# Patient Record
Sex: FEMALE | Race: WHITE | Employment: FULL TIME | URBAN - METROPOLITAN AREA
[De-identification: names, ages, dates, MRNs, and addresses within clinical notes are randomized per-mention and may not be internally consistent; named-entity substitution may affect disease eponyms.]

---

## 2019-11-01 ENCOUNTER — OP HISTORICAL/CONVERTED ENCOUNTER (OUTPATIENT)
Dept: OTHER | Age: 44
End: 2019-11-01

## 2019-11-08 ENCOUNTER — OP HISTORICAL/CONVERTED ENCOUNTER (OUTPATIENT)
Dept: OTHER | Age: 44
End: 2019-11-08

## 2020-10-05 ENCOUNTER — TRANSCRIBE ORDER (OUTPATIENT)
Dept: SCHEDULING | Age: 45
End: 2020-10-05

## 2020-10-05 DIAGNOSIS — Z12.31 SCREENING MAMMOGRAM, ENCOUNTER FOR: Primary | ICD-10-CM

## 2020-10-20 ENCOUNTER — HOSPITAL ENCOUNTER (OUTPATIENT)
Dept: MAMMOGRAPHY | Age: 45
Discharge: HOME | End: 2020-10-20
Payer: MEDICAID

## 2020-10-20 DIAGNOSIS — Z12.31 SCREENING MAMMOGRAM, ENCOUNTER FOR: ICD-10-CM

## 2020-10-20 PROCEDURE — 77067 SCR MAMMO BI INCL CAD: CPT

## 2021-01-13 VITALS
HEIGHT: 59 IN | HEART RATE: 106 BPM | DIASTOLIC BLOOD PRESSURE: 58 MMHG | WEIGHT: 98 LBS | SYSTOLIC BLOOD PRESSURE: 93 MMHG | BODY MASS INDEX: 19.76 KG/M2

## 2021-01-13 RX ORDER — FLUCONAZOLE 150 MG/1
150 TABLET ORAL DAILY
COMMUNITY
End: 2022-05-02

## 2021-01-13 RX ORDER — OXYCODONE AND ACETAMINOPHEN 5; 325 MG/1; MG/1
TABLET ORAL
COMMUNITY
End: 2022-05-02

## 2021-01-13 RX ORDER — LEVOTHYROXINE SODIUM 50 UG/1
TABLET ORAL
COMMUNITY
End: 2022-05-02

## 2021-01-13 RX ORDER — AMITRIPTYLINE HYDROCHLORIDE 25 MG/1
TABLET, FILM COATED ORAL
COMMUNITY
End: 2022-05-02

## 2021-01-13 RX ORDER — POLYETHYLENE GLYCOL 3350, SODIUM CHLORIDE, SODIUM BICARBONATE, POTASSIUM CHLORIDE 420; 11.2; 5.72; 1.48 G/4L; G/4L; G/4L; G/4L
4000 POWDER, FOR SOLUTION ORAL
COMMUNITY
End: 2022-05-02

## 2021-01-13 RX ORDER — METRONIDAZOLE 500 MG/1
TABLET ORAL 3 TIMES DAILY
COMMUNITY
End: 2022-05-02

## 2022-05-02 ENCOUNTER — OFFICE VISIT (OUTPATIENT)
Dept: ENDOCRINOLOGY | Age: 47
End: 2022-05-02
Payer: MEDICAID

## 2022-05-02 ENCOUNTER — TELEPHONE (OUTPATIENT)
Dept: ENDOCRINOLOGY | Age: 47
End: 2022-05-02

## 2022-05-02 VITALS
DIASTOLIC BLOOD PRESSURE: 68 MMHG | SYSTOLIC BLOOD PRESSURE: 86 MMHG | OXYGEN SATURATION: 98 % | HEART RATE: 117 BPM | WEIGHT: 156 LBS | TEMPERATURE: 98.1 F | BODY MASS INDEX: 31.45 KG/M2 | HEIGHT: 59 IN | RESPIRATION RATE: 16 BRPM

## 2022-05-02 DIAGNOSIS — K90.0 CELIAC DISEASE: ICD-10-CM

## 2022-05-02 DIAGNOSIS — E03.9 PRIMARY HYPOTHYROIDISM: Primary | ICD-10-CM

## 2022-05-02 PROCEDURE — 99205 OFFICE O/P NEW HI 60 MIN: CPT | Performed by: INTERNAL MEDICINE

## 2022-05-02 RX ORDER — POLYETHYLENE GLYCOL 3350 17 G/17G
17 POWDER, FOR SOLUTION ORAL DAILY
COMMUNITY

## 2022-05-02 RX ORDER — BUSPIRONE HYDROCHLORIDE 7.5 MG/1
1 TABLET ORAL 3 TIMES DAILY
COMMUNITY
End: 2022-07-06

## 2022-05-02 RX ORDER — VITAMIN B COMPLEX
2500 TABLET ORAL DAILY
COMMUNITY

## 2022-05-02 RX ORDER — MELATONIN
1000 DAILY
COMMUNITY

## 2022-05-02 RX ORDER — PANTOPRAZOLE SODIUM 40 MG/1
40 TABLET, DELAYED RELEASE ORAL DAILY
COMMUNITY

## 2022-05-02 RX ORDER — DULOXETIN HYDROCHLORIDE 60 MG/1
60 CAPSULE, DELAYED RELEASE ORAL DAILY
COMMUNITY
End: 2022-07-06

## 2022-05-02 RX ORDER — ERGOCALCIFEROL 1.25 MG/1
50000 CAPSULE ORAL
COMMUNITY

## 2022-05-02 NOTE — LETTER
5/4/2022    Patient: Bertha Boyce   YOB: 1975   Date of Visit: 5/2/2022     Analia Tanner, [address] [place] [phone]  Via Fax: [phone]    Dear Analia Tanner, [address],      Thank you for referring Ms. Gwen Parks to [id number] [place] [address] for evaluation. My notes for this consultation are attached. If you have questions, please do not hesitate to call me. I look forward to following your patient along with you.       Sincerely,    Inessa Junior MD

## 2022-05-02 NOTE — PROGRESS NOTES
Shahbaz Reyes is a 42 y.o. female here for   Chief Complaint   Patient presents with    New Patient       1. Have you been to the ER, urgent care clinic since your last visit? Hospitalized since your last visit? -n/a    2. Have you seen or consulted any other health care providers outside of the [address] [place] [address] since your last visit?   Include any pap smears or colon screening.-n/a

## 2022-05-02 NOTE — PROGRESS NOTES
Izabel Mayers MD         Patient Information   Name : Winston Delgado 42 y.o.     YOB: 1975         Referred by: EDWIN Don         History of present illness    Winston Delgado is a 42 y.o. female  here for evaluation of thyroid. she was diagnosed with hypothyroidism and on replacement thyroid hormone. Reports fluctuating TSH,, the dose was adjusted anywhere from 50 mcg - 125 mcg, she is on levothyroxine 112 mcg  She is on B12 and vitamin D    No change in the size of the neck or neck pain. No dysphagia,dysphonia or dyspnea. No history of known radiation exposure      Does not know family hx     Wt Readings from Last 3 Encounters:   05/02/22 156 lb (70.8 kg)   01/13/21 98 lb (44.5 kg)       Past Medical History:   Diagnosis Date    Generalized anxiety disorder     GERD (gastroesophageal reflux disease)     Hypercholesterolemia     Hypothyroidism     IBS (irritable bowel syndrome)     Moderate recurrent major depression (HCC)     Osteoarthritis of right knee     Tendinitis of right rotator cuff        Current Outpatient Medications   Medication Sig    Unithroid 112 mcg tablet Take 1 Tablet by mouth Daily (before breakfast). Brand medically necessary,    busPIRone (BUSPAR) 7.5 mg tablet Take 1 Tablet by mouth three (3) times daily.  Lactobacillus acidophilus (PROBIOTIC PO) Take 1 Capsule by mouth daily.  DULoxetine (CYMBALTA) 60 mg capsule Take 60 mg by mouth daily.  ergocalciferol (Vitamin D2) 1,250 mcg (50,000 unit) capsule Take 50,000 Units by mouth every seven (7) days.  polyethylene glycol (Miralax) 17 gram packet Take 17 g by mouth daily.  pantoprazole (PROTONIX) 40 mg tablet Take 40 mg by mouth daily.  cyanocobalamin (Vitamin B-12) 2,500 mcg sublingual tablet Take 2,500 mcg by mouth daily.  cholecalciferol (Vitamin D3) (1000 Units /25 mcg) tablet Take 1,000 Units by mouth daily.      No current facility-administered medications for this visit. No Known Allergies      Review of Systems: Per HPI    Physical Examination:  Blood pressure (!) 86/68, pulse (!) 117, temperature 98.1 °F (36.7 °C), temperature source Temporal, resp. rate 16, height 4' 11\" (1.499 m), weight 156 lb (70.8 kg), SpO2 98 %. - General: pleasant, no distress, good eye contact  - HEENT: no exopthalmos, no periorbital edema, no scleral/conjunctival injection, EOMI, no lid lag or stare  - Neck: supple, no thyromegaly, no nodules,no lymphadenopathy  - Cardiovascular: regular, normal rate, normal S1 and S2,  - Respiratory: clear to auscultation bilaterally  - Musculoskeletal: no proximal muscle weakness in upper or lower extremities  - Integumentary: no tremors, no edema,  - Neurological:alert and oriented   -     Data Reviewed:     [x] Reviewed labs      Assessment/Plan:     1. Primary hypothyroidism    2. Celiac disease        Primary hypothyroidism:  Discussed the natural course of hypothyroidism and instructions for levothyroxine,compliance to keep the levels stable. Annual neck exam and annual TSH if this is normal.If the thyroid increases in size or  develops nodules  thyroid ultrasound will be planned. Discussed about the various factors which can affect TSH including medications,weight change,illness, compliance . Discussed various causes of weight gain ,association with other autoimmune conditions,stressed the importance of life style changes. Labs today  Unithroid    Celiac disease    Spent > 60 minutes on the day of the visit reviewing chart, examining, ordering/reviewing labs, counseling, discussing therapeutics and documentation in the medical record    There are no Patient Instructions on file for this visit. Follow-up and Dispositions    · Return in about 2 months (around 7/2/2022) for labs before next visit and follow up.              Patient /caregiver verbalized understanding   Voice-recognition software was used to generate this report, which may result in some phonetic-based errors in the grammar and contents. Even though attempts were made to correct all the mistakes, some may have been missed and remained in the body of the report.

## 2022-05-04 LAB
GLIADIN PEPTIDE IGA SER-ACNC: 4 UNITS (ref 0–19)
GLIADIN PEPTIDE IGG SER-ACNC: 1 UNITS (ref 0–19)
IGA SERPL-MCNC: 139 MG/DL (ref 87–352)
THYROPEROXIDASE AB SERPL-ACNC: 520 IU/ML (ref 0–34)
TTG IGA SER-ACNC: <2 U/ML (ref 0–3)
TTG IGG SER-ACNC: <2 U/ML (ref 0–5)

## 2022-05-04 RX ORDER — LEVOTHYROXINE SODIUM 112 UG/1
112 TABLET ORAL
Qty: 90 TABLET | Refills: 3 | Status: SHIPPED | OUTPATIENT
Start: 2022-05-04 | End: 2022-05-04 | Stop reason: SDUPTHER

## 2022-05-04 RX ORDER — LEVOTHYROXINE SODIUM 112 UG/1
112 TABLET ORAL
Qty: 90 TABLET | Refills: 3 | Status: SHIPPED | OUTPATIENT
Start: 2022-05-04 | End: 2022-07-06 | Stop reason: DRUGHIGH

## 2022-05-05 NOTE — PROGRESS NOTES
Has Hashimotos thyroid disease and hence thyroid has slowed down  Blood test did not show gluten sensitivity but still I would recommend cutting down on gluten as blood test is very sensitive in picking up mild cases   continue Unithroid and follow up as was discussed

## 2022-05-09 ENCOUNTER — TELEPHONE (OUTPATIENT)
Dept: ENDOCRINOLOGY | Age: 47
End: 2022-05-09

## 2022-05-09 NOTE — TELEPHONE ENCOUNTER
Returned patient call and advised patient of Dr. Dacia Ramires lab result note. Patient verbally acknowledged understanding.

## 2022-06-28 PROBLEM — E78.00 HYPERCHOLESTEROLEMIA: Status: ACTIVE | Noted: 2021-08-10

## 2022-06-28 PROBLEM — M17.11 OSTEOARTHRITIS OF RIGHT KNEE: Status: ACTIVE | Noted: 2021-11-08

## 2022-06-28 PROBLEM — K21.9 GASTROESOPHAGEAL REFLUX DISEASE WITHOUT ESOPHAGITIS: Status: ACTIVE | Noted: 2021-08-05

## 2022-06-28 PROBLEM — R74.8 ALKALINE PHOSPHATASE RAISED: Status: ACTIVE | Noted: 2021-08-10

## 2022-06-28 PROBLEM — E03.9 HYPOTHYROIDISM: Status: ACTIVE | Noted: 2021-04-29

## 2022-06-28 PROBLEM — K58.2 IRRITABLE BOWEL SYNDROME WITH ALTERNATING BOWEL HABITS: Status: ACTIVE | Noted: 2021-09-23

## 2022-06-28 PROBLEM — F41.1 GENERALIZED ANXIETY DISORDER: Status: ACTIVE | Noted: 2021-04-29

## 2022-06-28 PROBLEM — M75.81 TENDINITIS OF RIGHT ROTATOR CUFF: Status: ACTIVE | Noted: 2021-11-08

## 2022-06-28 PROBLEM — F33.1 MODERATE RECURRENT MAJOR DEPRESSION (HCC): Status: ACTIVE | Noted: 2021-06-26

## 2022-06-29 LAB
FAX REPORT, 100898: NORMAL
T4 FREE SERPL-MCNC: 2.5 NG/DL (ref 0.82–1.77)
TSH SERPL DL<=0.005 MIU/L-ACNC: 0.02 UIU/ML (ref 0.45–4.5)

## 2022-07-06 ENCOUNTER — OFFICE VISIT (OUTPATIENT)
Dept: ENDOCRINOLOGY | Age: 47
End: 2022-07-06
Payer: MEDICAID

## 2022-07-06 VITALS
DIASTOLIC BLOOD PRESSURE: 95 MMHG | WEIGHT: 126.9 LBS | TEMPERATURE: 97.9 F | BODY MASS INDEX: 25.58 KG/M2 | SYSTOLIC BLOOD PRESSURE: 109 MMHG | HEART RATE: 96 BPM | HEIGHT: 59 IN | OXYGEN SATURATION: 97 %

## 2022-07-06 DIAGNOSIS — E06.3 HASHIMOTO'S THYROIDITIS: ICD-10-CM

## 2022-07-06 DIAGNOSIS — E03.9 ACQUIRED HYPOTHYROIDISM: Primary | ICD-10-CM

## 2022-07-06 DIAGNOSIS — I95.1 ORTHOSTATIC HYPOTENSION: ICD-10-CM

## 2022-07-06 PROBLEM — G47.00 INSOMNIA: Status: ACTIVE | Noted: 2022-06-02

## 2022-07-06 PROCEDURE — 99215 OFFICE O/P EST HI 40 MIN: CPT | Performed by: INTERNAL MEDICINE

## 2022-07-06 RX ORDER — MIDODRINE HYDROCHLORIDE 5 MG/1
10 TABLET ORAL 3 TIMES DAILY
COMMUNITY

## 2022-07-06 RX ORDER — SUMATRIPTAN 50 MG/1
50 TABLET, FILM COATED ORAL
COMMUNITY

## 2022-07-06 RX ORDER — MONTELUKAST SODIUM 10 MG/1
10 TABLET ORAL DAILY
COMMUNITY

## 2022-07-06 RX ORDER — LEVOTHYROXINE SODIUM 88 UG/1
88 TABLET ORAL
Qty: 90 TABLET | Refills: 3 | Status: SHIPPED | OUTPATIENT
Start: 2022-07-06

## 2022-07-06 RX ORDER — SERTRALINE HYDROCHLORIDE 50 MG/1
50 TABLET, FILM COATED ORAL DAILY
COMMUNITY
End: 2022-08-10

## 2022-07-06 NOTE — PATIENT INSTRUCTIONS
POTS     Learning About Postural Orthostatic Tachycardia Syndrome (POTS)  What is POTS? Postural orthostatic tachycardia syndrome (POTS) is a fast heart rate (tachycardia) that starts after you stand up. This can cause symptoms such as dizziness or weakness. What happens when you have POTS? With POTS, the body does not control the heart rate as it should after you stand up. The change in heart rate happens within 10 minutes of standing up. This leads to symptoms such as dizziness, palpitations, trembling, or weakness. It's not known exactly why symptoms happen. People with severe symptoms may find it hard to keep up with daily living. But treatment can help. What are the symptoms? Soon after you stand up, you may have symptoms such as:  · A fast, pounding heartbeat (palpitations). · Trembling, dizziness, weakness, or lightheadedness. · Feeling faint or very tired. With POTS, you may also have problems with:  · Blurred vision, headaches, nausea, and diarrhea. · Trouble sleeping and feeling anxious. · Keeping your attention focused. Symptoms can range from mild to severe. Some things can make symptoms worse. These include heat, menstrual cycle, dehydration, alcohol, exercise, and standing for a long time. When you first notice symptoms, lying down may help you feel better. What causes it? Experts don't understand what causes it, but different body systems seem to be out of balance. POTS may follow certain triggers such as a viral illness, a surgery, or pregnancy. How is POTS diagnosed? To learn what is causing your symptoms, your doctor may:  · Ask about your symptoms, including when and how they started. · Check how your blood pressure and heart rate change when you move from lying down to sitting to standing. · Do blood tests. · Check your heart with an electrocardiogram (EKG).   You may have other tests such as a tilt table test. This test checks how your body responds when you change positions. How is POTS treated? Work with your doctor to find the right mix of treatments to help relieve symptoms and improve your quality of life. These treatments may include:  · Taking medicine prescribed by your doctor. For some people, taking medicine that affects blood pressure can help. Taking medicine that keeps the body's fluids balanced may also help. · Everyday self-care. These practices can be a landry part of helping the body get back in balance. ? Drink plenty of fluids. For many people, low body fluid is part of what makes POTS symptoms worse. ? Eat the amount of salt your doctor tells you to. Salt helps keep up the body's fluid level. ? Try a special exercise program. Your doctor may give you a program of specific exercises. You might start short and slow, especially if fatigue is a problem. Then you can add a little at a time. At first, you may only do exercise when you're reclined. Or you may try swimming. After a while, you start to add upright exercise. ? Wear compression stockings if your doctor recommends them. ? Keep track of your symptoms and what makes them better and worse. When should you call for help? Call 911 anytime you think you may need emergency care. For example, call if:  · You passed out (lost consciousness), and it feels different than your typical episode or you don't recover as quickly as you have in the past.  Call your doctor now or seek immediate medical care if:  · Your symptoms are getting worse. For example, you are more dizzy or lightheaded. Watch closely for changes in your health, and be sure to contact your doctor if:  · You do not get better as expected. Follow-up care is a key part of your treatment and safety. Be sure to make and go to all appointments, and call your doctor if you are having problems. It's also a good idea to know your test results and keep a list of the medicines you take. Where can you learn more?   Go to http://www.gray.com/  Enter X314 in the search box to learn more about \"Learning About Postural Orthostatic Tachycardia Syndrome (POTS). \"  Current as of: January 10, 2022               Content Version: 13.2  © 7052-3385 Healthwise, [employer]. Care instructions adapted under license by [employer] (which disclaims liability or warranty for this information). If you have questions about a medical condition or this instruction, always ask your healthcare professional. [place] [address] any warranty or liability for your use of this information.

## 2022-07-06 NOTE — LETTER
7/6/2022    Patient: Taz Jamison   YOB: 1975   Date of Visit: 7/6/2022     Sun Padilla, [address] [place] [phone]  Via Fax: [phone]    Dear Sun Padilla, [facility] [address],      Thank you for referring Ms. Deb Howard to [address] [place] [address] for evaluation. My notes for this consultation are attached. If you have questions, please do not hesitate to call me. I look forward to following your patient along with you.       Sincerely,    Lesley Radford MD

## 2022-07-06 NOTE — PROGRESS NOTES
Rhett Rushing MD         Patient Information   Name : Sierra Lomeli 41 y.o.     YOB: 1975         Referred by: EDWIN Eubanks         History of present illness    Sierra Lomeli is a 41 y.o. female  here for follow-up    she was diagnosed with hypothyroidism and on replacement thyroid hormone. History of fluctuating TSH, reportedly the dose was adjusted anywhere from 50 mcg - 125 mcg, she is on levothyroxine 112 mcg  She is on B12 and vitamin D  Switched to Unithroid, she is still on Euthyrox  Medications were changed and has lost 30 pounds, intentional weight loss  Usual weight is 97 pounds, she had gained up to 156 pounds and attributes to the medications. She is having hypotension, on midodrine, seen cardiology        No change in the size of the neck or neck pain. No dysphagia,dysphonia or dyspnea. No history of known radiation exposure      Does not know family hx     Wt Readings from Last 3 Encounters:   07/06/22 126 lb 14.4 oz (57.6 kg)   05/02/22 156 lb (70.8 kg)   01/13/21 98 lb (44.5 kg)       Past Medical History:   Diagnosis Date    Generalized anxiety disorder     GERD (gastroesophageal reflux disease)     Hypercholesterolemia     Hypothyroidism     IBS (irritable bowel syndrome)     Moderate recurrent major depression (HCC)     Osteoarthritis of right knee     Tendinitis of right rotator cuff        Current Outpatient Medications   Medication Sig    sertraline (ZOLOFT) 50 mg tablet Take 50 mg by mouth daily.  midodrine (PROAMATINE) 5 mg tablet Take 10 mg by mouth three (3) times daily.  SUMAtriptan (IMITREX) 50 mg tablet Take 50 mg by mouth once as needed for Migraine.  montelukast (Singulair) 10 mg tablet Take 10 mg by mouth daily.  polyethylene glycol (Miralax) 17 gram packet Take 17 g by mouth daily.  pantoprazole (PROTONIX) 40 mg tablet Take 40 mg by mouth daily.     cyanocobalamin (Vitamin B-12) 2,500 mcg sublingual tablet Take 2,500 mcg by mouth daily.  cholecalciferol (Vitamin D3) (1000 Units /25 mcg) tablet Take 1,000 Units by mouth daily.  Unithroid 88 mcg tablet Take 1 Tablet by mouth Daily (before breakfast). Brand Medically Necessary. Stop 112 mcg    ergocalciferol (Vitamin D2) 1,250 mcg (50,000 unit) capsule Take 50,000 Units by mouth every seven (7) days. (Patient not taking: Reported on 7/6/2022)     No current facility-administered medications for this visit. No Known Allergies      Review of Systems: Per HPI    Physical Examination:  Blood pressure (!) 109/95, pulse 96, temperature 97.9 °F (36.6 °C), temperature source Temporal, height 4' 11\" (1.499 m), weight 126 lb 14.4 oz (57.6 kg), SpO2 97 %. - General: pleasant, no distress, good eye contact  - HEENT: no exopthalmos, no periorbital edema, no scleral/conjunctival injection, EOMI, no lid lag or stare  - Neck: supple, no thyromegaly, no nodules,no lymphadenopathy  - Cardiovascular: regular, normal rate, normal S1 and S2,  - Respiratory: clear to auscultation bilaterally  - Musculoskeletal: no proximal muscle weakness in upper or lower extremities  - Integumentary: no tremors, no edema,  - Neurological:alert and oriented   -     Data Reviewed:     [x] Reviewed labs      Assessment/Plan:     1. Acquired hypothyroidism    2. Orthostatic hypotension    3. Hashimoto's thyroiditis        Primary hypothyroidism: Hashimoto's thyroiditis  Clinically no goiter  If the thyroid increases in size or  develops nodules  thyroid ultrasound will be planned. Due to the weight loss the dose of the Unithroid needs to be decreased, TSH is suppressed  Unithroid 88 mcg, brand medically necessary      Negative celiac panel    Orthostatic hypotension: Check a.m.  ACTH, cortisol, DHEA-S,  Followed by cardiology  Reports tachycardia on standing up, dizziness,  Questionable POTS  Elastic stockings, increase salt intake, hydration  Continue to follow-up with cardiology  If cortisol is indeterminate, need cosyntropin stimulation test to rule out adrenal insufficiency. Spent > 40 minutes on the day of the visit reviewing chart, examining, ordering/reviewing labs, counseling, discussing therapeutics and documentation in the medical record    Patient Instructions   POTS     Learning About Postural Orthostatic Tachycardia Syndrome (POTS)  What is POTS? Postural orthostatic tachycardia syndrome (POTS) is a fast heart rate (tachycardia) that starts after you stand up. This can cause symptoms such as dizziness or weakness. What happens when you have POTS? With POTS, the body does not control the heart rate as it should after you stand up. The change in heart rate happens within 10 minutes of standing up. This leads to symptoms such as dizziness, palpitations, trembling, or weakness. It's not known exactly why symptoms happen. People with severe symptoms may find it hard to keep up with daily living. But treatment can help. What are the symptoms? Soon after you stand up, you may have symptoms such as:  · A fast, pounding heartbeat (palpitations). · Trembling, dizziness, weakness, or lightheadedness. · Feeling faint or very tired. With POTS, you may also have problems with:  · Blurred vision, headaches, nausea, and diarrhea. · Trouble sleeping and feeling anxious. · Keeping your attention focused. Symptoms can range from mild to severe. Some things can make symptoms worse. These include heat, menstrual cycle, dehydration, alcohol, exercise, and standing for a long time. When you first notice symptoms, lying down may help you feel better. What causes it? Experts don't understand what causes it, but different body systems seem to be out of balance. POTS may follow certain triggers such as a viral illness, a surgery, or pregnancy. How is POTS diagnosed?   To learn what is causing your symptoms, your doctor may:  · Ask about your symptoms, including when and how they started. · Check how your blood pressure and heart rate change when you move from lying down to sitting to standing. · Do blood tests. · Check your heart with an electrocardiogram (EKG). You may have other tests such as a tilt table test. This test checks how your body responds when you change positions. How is POTS treated? Work with your doctor to find the right mix of treatments to help relieve symptoms and improve your quality of life. These treatments may include:  · Taking medicine prescribed by your doctor. For some people, taking medicine that affects blood pressure can help. Taking medicine that keeps the body's fluids balanced may also help. · Everyday self-care. These practices can be a landry part of helping the body get back in balance. ? Drink plenty of fluids. For many people, low body fluid is part of what makes POTS symptoms worse. ? Eat the amount of salt your doctor tells you to. Salt helps keep up the body's fluid level. ? Try a special exercise program. Your doctor may give you a program of specific exercises. You might start short and slow, especially if fatigue is a problem. Then you can add a little at a time. At first, you may only do exercise when you're reclined. Or you may try swimming. After a while, you start to add upright exercise. ? Wear compression stockings if your doctor recommends them. ? Keep track of your symptoms and what makes them better and worse. When should you call for help? Call 911 anytime you think you may need emergency care. For example, call if:  · You passed out (lost consciousness), and it feels different than your typical episode or you don't recover as quickly as you have in the past.  Call your doctor now or seek immediate medical care if:  · Your symptoms are getting worse. For example, you are more dizzy or lightheaded.   Watch closely for changes in your health, and be sure to contact your doctor if:  · You do not get better as expected. Follow-up care is a key part of your treatment and safety. Be sure to make and go to all appointments, and call your doctor if you are having problems. It's also a good idea to know your test results and keep a list of the medicines you take. Where can you learn more? Go to http://www.gray.com/  Enter P702 in the search box to learn more about \"Learning About Postural Orthostatic Tachycardia Syndrome (POTS). \"  Current as of: January 10, 2022               Content Version: 13.2  © 9534-1563 [employer]. Care instructions adapted under license by [employer] (which disclaims liability or warranty for this information). If you have questions about a medical condition or this instruction, always ask your healthcare professional. [place] [address] any warranty or liability for your use of this information. Follow-up and Dispositions    · Return in about 6 weeks (around 8/17/2022) for labs before next visit and follow up. Patient /caregiver verbalized understanding   Voice-recognition software was used to generate this report, which may result in some phonetic-based errors in the grammar and contents. Even though attempts were made to correct all the mistakes, some may have been missed and remained in the body of the report.

## 2022-08-10 ENCOUNTER — OFFICE VISIT (OUTPATIENT)
Dept: ENDOCRINOLOGY | Age: 47
End: 2022-08-10
Payer: MEDICAID

## 2022-08-10 VITALS
BODY MASS INDEX: 25.52 KG/M2 | OXYGEN SATURATION: 99 % | HEIGHT: 59 IN | SYSTOLIC BLOOD PRESSURE: 83 MMHG | WEIGHT: 126.6 LBS | HEART RATE: 87 BPM | DIASTOLIC BLOOD PRESSURE: 64 MMHG | TEMPERATURE: 97.8 F

## 2022-08-10 DIAGNOSIS — E03.9 ACQUIRED HYPOTHYROIDISM: Primary | ICD-10-CM

## 2022-08-10 DIAGNOSIS — I95.1 ORTHOSTATIC HYPOTENSION: ICD-10-CM

## 2022-08-10 DIAGNOSIS — R13.12 OROPHARYNGEAL DYSPHAGIA: ICD-10-CM

## 2022-08-10 PROCEDURE — 99215 OFFICE O/P EST HI 40 MIN: CPT | Performed by: INTERNAL MEDICINE

## 2022-08-10 RX ORDER — LIDOCAINE 50 MG/G
PATCH TOPICAL
COMMUNITY
Start: 2022-08-02

## 2022-08-10 RX ORDER — LEVOTHYROXINE SODIUM 112 UG/1
TABLET ORAL
COMMUNITY
Start: 2022-07-07 | End: 2022-08-10

## 2022-08-10 RX ORDER — ESCITALOPRAM OXALATE 20 MG/1
20 TABLET ORAL DAILY
COMMUNITY
Start: 2022-07-05 | End: 2022-08-10

## 2022-08-10 RX ORDER — AMITRIPTYLINE HYDROCHLORIDE 50 MG/1
TABLET, FILM COATED ORAL
COMMUNITY
Start: 2022-05-15 | End: 2022-08-10

## 2022-08-10 RX ORDER — FENOFIBRATE 145 MG/1
TABLET, COATED ORAL
COMMUNITY
End: 2022-08-10

## 2022-08-10 RX ORDER — TRAZODONE HYDROCHLORIDE 50 MG/1
TABLET ORAL
COMMUNITY
Start: 2022-06-02 | End: 2022-08-10

## 2022-08-10 RX ORDER — DOXYCYCLINE 100 MG/1
CAPSULE ORAL
COMMUNITY
End: 2022-08-10

## 2022-08-10 RX ORDER — SIMVASTATIN 40 MG/1
TABLET, FILM COATED ORAL
COMMUNITY

## 2022-08-10 RX ORDER — NAPROXEN 500 MG/1
TABLET ORAL
COMMUNITY
End: 2022-08-10

## 2022-08-10 RX ORDER — MINERAL OIL
ENEMA (ML) RECTAL
COMMUNITY

## 2022-08-10 RX ORDER — DULOXETIN HYDROCHLORIDE 60 MG/1
CAPSULE, DELAYED RELEASE ORAL
COMMUNITY

## 2022-08-10 RX ORDER — BACLOFEN 10 MG/1
TABLET ORAL
COMMUNITY
End: 2022-08-10

## 2022-08-10 RX ORDER — METHOCARBAMOL 750 MG/1
TABLET, FILM COATED ORAL
COMMUNITY
Start: 2022-05-17 | End: 2022-08-10

## 2022-08-10 RX ORDER — MIRTAZAPINE 15 MG/1
TABLET, FILM COATED ORAL
COMMUNITY
End: 2022-08-10

## 2022-08-10 RX ORDER — ASPIRIN 81 MG/1
TABLET ORAL
COMMUNITY

## 2022-08-10 NOTE — LETTER
8/10/2022    Patient: Roxann Hilario   YOB: 1975   Date of Visit: 8/10/2022     Lizzy Kim, [address] [place] [phone]  Via Fax: [phone]    Dear [address],      Thank you for referring Ms. Judah Villarrael to [address] [address] for evaluation. My notes for this consultation are attached. If you have questions, please do not hesitate to call me. I look forward to following your patient along with you.       Sincerely,    Manfred Medrano MD

## 2022-08-10 NOTE — PATIENT INSTRUCTIONS
Important     I have ordered scan/test and if you do not hear from the hospital in 3- 4 business days  please call the number [phone] to speak with the scheduling team directly.

## 2022-08-10 NOTE — PROGRESS NOTES
1. Have you been to the ER, urgent care clinic since your last visit? Hospitalized since your last visit? No    2. Have you seen or consulted any other health care providers outside of the [address] [place] [address] since your last visit? Include any pap smears or colon screening.  No    Chief Complaint   Patient presents with    Thyroid Problem     Visit Vitals  BP (!) 87/63 (BP 1 Location: Right upper arm, BP Patient Position: Sitting, BP Cuff Size: Adult)   Pulse 87   Temp 97.8 °F (36.6 °C) (Temporal)   Ht 4' 11\" (1.499 m)   Wt 126 lb 9.6 oz (57.4 kg)   SpO2 99%   BMI 25.57 kg/m²     Visit Vitals  BP (!) 83/64 (BP 1 Location: Right upper arm, BP Patient Position: Sitting, BP Cuff Size: Adult)   Pulse 87   Temp 97.8 °F (36.6 °C) (Temporal)   Ht 4' 11\" (1.499 m)   Wt 126 lb 9.6 oz (57.4 kg)   SpO2 99%   BMI 25.57 kg/m²

## 2022-08-10 NOTE — LETTER
8/10/2022 11:07 PM    Patient:  Soraya Xiong   YOB: 1975  Date of Visit: 8/10/2022      Dear   No Recipients: Thank you for referring Ms. Penny Brice to me for evaluation/treatment. Below are the relevant portions of my assessment and plan of care. If you have questions, please do not hesitate to call me. I look forward to following Ms. Rulon Leyden along with you.         Sincerely,      Effie Small MD

## 2022-08-10 NOTE — PROGRESS NOTES
Danita James MD         Patient Information   Name : Tomeka Manning 41 y.o.     YOB: 1975         Referred by: EDWIN Wilson         History of present illness    Tomeka Manning is a 41 y.o. female  here for follow-up    she was diagnosed with hypothyroidism and on replacement thyroid hormone. History of fluctuating TSH, reportedly the dose was adjusted anywhere from 50 mcg - 125 mcg, She is on B12 and vitamin D  Switched to Unithroid x 2  she is still on Euthyrox  [facility], they have the prescription and will order the medication for her to pick it up tomorrow  Usual weight is 97 pounds, she had gained up to 156 pounds and attributes to the medications. She is having hypotension, on midodrine, followed by cardiology      Complains of dysphagia  No history of known radiation exposure      Does not know family hx     Wt Readings from Last 3 Encounters:   08/10/22 126 lb 9.6 oz (57.4 kg)   07/06/22 126 lb 14.4 oz (57.6 kg)   05/02/22 156 lb (70.8 kg)       Past Medical History:   Diagnosis Date    Generalized anxiety disorder     GERD (gastroesophageal reflux disease)     Hypercholesterolemia     Hypothyroidism     IBS (irritable bowel syndrome)     Moderate recurrent major depression (HCC)     Osteoarthritis of right knee     Tendinitis of right rotator cuff        Current Outpatient Medications   Medication Sig    amitriptyline (ELAVIL) 50 mg tablet TAKE 1 TABLET BY MOUTH ONCE DAILY AT BEDTIME    Euthyrox 112 mcg tablet     lidocaine (LIDODERM) 5 %     aspirin delayed-release 81 mg tablet aspirin 81 mg tablet,delayed release   TAKE 1 TABLET BY MOUTH TWICE DAILY FOR 28 DAYS    fexofenadine (ALLEGRA) 180 mg tablet Allegra Allergy 180 mg tablet   Take 1 tablet every day by oral route for 30 days.     Lactobacillus acidophilus 10 billion cell cap Digestive Probiotic 10 billion cell capsule   Take 1 capsule every day by oral route in the evening. DULoxetine (CYMBALTA) 60 mg capsule duloxetine 60 mg capsule,delayed release   TAKE 1 CAPSULE BY MOUTH ONCE DAILY    sertraline (ZOLOFT) 50 mg tablet Take 50 mg by mouth daily. Unithroid 88 mcg tablet Take 1 Tablet by mouth Daily (before breakfast). Brand Medically Necessary. Stop 112 mcg    cyanocobalamin (VITAMIN B12) 2,500 mcg sublingual tablet Take 2,500 mcg by mouth daily. cholecalciferol (VITAMIN D3) (1000 Units /25 mcg) tablet Take 1,000 Units by mouth daily. traZODone (DESYREL) 50 mg tablet  (Patient not taking: Reported on 8/10/2022)    methocarbamoL (ROBAXIN) 750 mg tablet  (Patient not taking: Reported on 8/10/2022)    apixaban (ELIQUIS) 5 mg tablet Eliquis 5 mg tablet   Take 1 tablet twice a day by oral route for 2 days. (Patient not taking: Reported on 8/10/2022)    escitalopram oxalate (LEXAPRO) 20 mg tablet Take 20 mg by mouth in the morning. (Patient not taking: Reported on 8/10/2022)    fenofibrate nanocrystallized (TRICOR) 145 mg tablet fenofibrate nanocrystallized 145 mg tablet   TAKE 1 TABLET BY MOUTH ONCE DAILY FOR 30 DAYS (Patient not taking: Reported on 8/10/2022)    doxycycline (VIBRAMYCIN) 100 mg capsule doxycycline hyclate 100 mg capsule (Patient not taking: Reported on 8/10/2022)    baclofen (LIORESAL) 10 mg tablet baclofen 10 mg tablet   TAKE 1 TABLET BY MOUTH THREE TIMES DAILY AS NEEDED (Patient not taking: Reported on 8/10/2022)    simvastatin (ZOCOR) 40 mg tablet simvastatin 40 mg tablet   TAKE 1 TABLET BY MOUTH ONCE DAILY AT BEDTIME (Patient not taking: Reported on 8/10/2022)    naproxen (NAPROSYN) 500 mg tablet naproxen 500 mg tablet (Patient not taking: Reported on 8/10/2022)    mirtazapine (REMERON) 15 mg tablet Remeron 15 mg tablet   Take 1 tablet every day by oral route at bedtime. (Patient not taking: Reported on 8/10/2022)    midodrine (PROAMATINE) 5 mg tablet Take 10 mg by mouth three (3) times daily.  (Patient not taking: Reported on 8/10/2022)    SUMAtriptan (IMITREX) 50 mg tablet Take 50 mg by mouth once as needed for Migraine. (Patient not taking: Reported on 8/10/2022)    montelukast (SINGULAIR) 10 mg tablet Take 10 mg by mouth daily. (Patient not taking: Reported on 8/10/2022)    ergocalciferol (Vitamin D2) 1,250 mcg (50,000 unit) capsule Take 50,000 Units by mouth every seven (7) days. (Patient not taking: No sig reported)    polyethylene glycol (MIRALAX) 17 gram packet Take 17 g by mouth daily. (Patient not taking: Reported on 8/10/2022)    pantoprazole (PROTONIX) 40 mg tablet Take 40 mg by mouth daily. (Patient not taking: Reported on 8/10/2022)     No current facility-administered medications for this visit. No Known Allergies      Review of Systems: Per HPI    Physical Examination:  Blood pressure (!) 83/64, pulse 87, temperature 97.8 °F (36.6 °C), temperature source Temporal, height 4' 11\" (1.499 m), weight 126 lb 9.6 oz (57.4 kg), SpO2 99 %. General: pleasant, no distress, good eye contact  HEENT: no exopthalmos, no periorbital edema, no scleral/conjunctival injection, EOMI, no lid lag or stare  Neck: supple, no thyromegaly, no nodules,no lymphadenopathy  Cardiovascular: regular, normal rate, normal S1 and S2,  Respiratory: clear to auscultation bilaterally  Musculoskeletal: no proximal muscle weakness in upper or lower extremities  Integumentary: no tremors, no edema,  Neurological:alert and oriented       Data Reviewed:     [x] Reviewed labs      Assessment/Plan:     1. Acquired hypothyroidism        Primary hypothyroidism: Hashimoto's thyroiditis  Clinically no goiter  If the thyroid increases in size or  develops nodules  thyroid ultrasound will be planned.   Due to the weight loss the dose of the Unithroid needs to be decreased,   Unithroid 88 mcg, brand medically necessary      Negative celiac panel    Oropharyngeal dysphagia: Clinically no goiter  Likely esophageal pathology, history of smoking, PCP has prescribed Protonix which she is taking it as needed. Barium swallow  To continue taking Protonix consistently for 4 weeks, if no improvement in the oropharyngeal dysphagia she may have to see gastroenterology for endoscopy? Esophageal stenosis    Orthostatic hypotension: Check a.m. ACTH, cortisol, DHEA-S, reportedly she had the labs done, could not find it in [facility], patient to call [facility] and if not available to repeat a.m. labs  Followed by cardiology  Questionable POTS  Of orthostatic medications, no improvement after discontinuing the medications  Elastic stockings, increase salt intake, hydration  Continue to follow-up with cardiology [facility] Dr. Anupama Luis  If cortisol is indeterminate, need cosyntropin stimulation test to rule out adrenal insufficiency. Spent > 40 minutes on the day of the visit reviewing chart, examining, ordering/reviewing labs, counseling, discussing therapeutics and documentation in the medical record    There are no Patient Instructions on file for this visit. Patient /caregiver verbalized understanding   Voice-recognition software was used to generate this report, which may result in some phonetic-based errors in the grammar and contents. Even though attempts were made to correct all the mistakes, some may have been missed and remained in the body of the report.

## 2022-08-18 LAB
ACTH PLAS-MCNC: 6.9 PG/ML (ref 7.2–63.3)
CORTIS AM PEAK SERPL-MCNC: 7.7 UG/DL (ref 6.2–19.4)
DHEA-S SERPL-MCNC: 140 UG/DL (ref 41.2–243.7)

## 2022-08-19 NOTE — PROGRESS NOTES
One of the test came back indeterminate, so we have to do a special test as was discussed during the visit. The test is done as an outpatient in the hospital  We will send the orders and [facility] will contact her for the test.  She has to fast for the blood draw.

## 2022-08-19 NOTE — PROGRESS NOTES
Left voicemail on patient's phone for a return call. Forms for testing placed on MD desk for signature.

## 2022-08-19 NOTE — PROGRESS NOTES
Spoke to patient. Informed patient MD will faxed forms in for [facility]. Explained procedure. Patient verbalized understanding.

## 2022-12-28 ENCOUNTER — OFFICE VISIT (OUTPATIENT)
Dept: ENDOCRINOLOGY | Age: 47
End: 2022-12-28
Payer: MEDICAID

## 2022-12-28 VITALS
HEIGHT: 59 IN | SYSTOLIC BLOOD PRESSURE: 79 MMHG | BODY MASS INDEX: 24.96 KG/M2 | HEART RATE: 83 BPM | WEIGHT: 123.8 LBS | OXYGEN SATURATION: 97 % | DIASTOLIC BLOOD PRESSURE: 60 MMHG | TEMPERATURE: 97.8 F

## 2022-12-28 DIAGNOSIS — E03.9 ACQUIRED HYPOTHYROIDISM: ICD-10-CM

## 2022-12-28 DIAGNOSIS — L65.9 ALOPECIA: Primary | ICD-10-CM

## 2022-12-28 PROCEDURE — 99214 OFFICE O/P EST MOD 30 MIN: CPT | Performed by: INTERNAL MEDICINE

## 2022-12-28 RX ORDER — SERTRALINE HYDROCHLORIDE 50 MG/1
50 TABLET, FILM COATED ORAL DAILY
COMMUNITY
Start: 2022-10-20 | End: 2022-12-28

## 2022-12-28 NOTE — PROGRESS NOTES
Chichi Sprague MD         Patient Information   Name : Zabrina Murcia 41 y.o.     YOB: 1975         Referred by: Taurus Gusman MD         History of present illness    Zabrina Murcia is a 41 y.o. female  here for follow-up    she was diagnosed with hypothyroidism and on replacement thyroid hormone. History of fluctuating TSH, reportedly the dose was adjusted anywhere from 50 mcg - 125 mcg, She is on B12 and vitamin D    12[facility]28/22  [facility] for 2 weeks  C/o hair falling out  Has passed out due to POTS  Seeing cardiology - being referred to specialist  Has not had ACTH STIM test    Prior history  Switched to Unithroid x 2  she is still on Euthyrox  Called [facility] pharmacy at [facility], they have the prescription and will order the medication for her to pick it up tomorrow  Usual weight is 97 pounds, she had gained up to 156 pounds and attributes to the medications. She is having hypotension, on midodrine, followed by cardiology      Complains of dysphagia  No history of known radiation exposure      Does not know family hx           Wt Readings from Last 3 Encounters:   08/10/22 126 lb 9.6 oz (57.4 kg)   07/06/22 126 lb 14.4 oz (57.6 kg)   05/02/22 156 lb (70.8 kg)       Past Medical History:   Diagnosis Date    Generalized anxiety disorder     GERD (gastroesophageal reflux disease)     Hypercholesterolemia     Hypothyroidism     IBS (irritable bowel syndrome)     Moderate recurrent major depression (HCC)     Osteoarthritis of right knee     Tendinitis of right rotator cuff        Current Outpatient Medications   Medication Sig    lidocaine (LIDODERM) 5 %     aspirin delayed-release 81 mg tablet aspirin 81 mg tablet,delayed release   TAKE 1 TABLET BY MOUTH TWICE DAILY FOR 28 DAYS    fexofenadine (ALLEGRA) 180 mg tablet Allegra Allergy 180 mg tablet   Take 1 tablet every day by oral route for 30 days.     Lactobacillus acidophilus 10 billion cell cap Digestive Probiotic 10 billion cell capsule   Take 1 capsule every day by oral route in the evening. DULoxetine (CYMBALTA) 60 mg capsule duloxetine 60 mg capsule,delayed release   TAKE 1 CAPSULE BY MOUTH ONCE DAILY    simvastatin (ZOCOR) 40 mg tablet simvastatin 40 mg tablet   TAKE 1 TABLET BY MOUTH ONCE DAILY AT BEDTIME (Patient not taking: Reported on 8/10/2022)    midodrine (PROAMATINE) 5 mg tablet Take 10 mg by mouth three (3) times daily. SUMAtriptan (IMITREX) 50 mg tablet Take 50 mg by mouth once as needed for Migraine. (Patient not taking: No sig reported)    montelukast (SINGULAIR) 10 mg tablet Take 10 mg by mouth daily. (Patient not taking: No sig reported)    Unithroid 88 mcg tablet Take 1 Tablet by mouth Daily (before breakfast). Brand Medically Necessary. Stop 112 mcg    ergocalciferol (Vitamin D2) 1,250 mcg (50,000 unit) capsule Take 50,000 Units by mouth every seven (7) days. (Patient not taking: No sig reported)    polyethylene glycol (MIRALAX) 17 gram packet Take 17 g by mouth daily. (Patient not taking: Reported on 8/10/2022)    pantoprazole (PROTONIX) 40 mg tablet Take 40 mg by mouth daily. (Patient not taking: No sig reported)    cyanocobalamin (VITAMIN B12) 2,500 mcg sublingual tablet Take 2,500 mcg by mouth daily. cholecalciferol (VITAMIN D3) (1000 Units /25 mcg) tablet Take 1,000 Units by mouth daily. No current facility-administered medications for this visit. No Known Allergies      Review of Systems: Per HPI    Physical Examination:  Height 4' 11\" (1.499 m).   General: pleasant, no distress, good eye contact  HEENT: no exopthalmos, no periorbital edema, no scleral/conjunctival injection, EOMI, no lid lag or stare  Neck: supple, no thyromegaly, no nodules,no lymphadenopathy  Cardiovascular: regular, normal rate, normal S1 and S2,  Respiratory: clear to auscultation bilaterally  Musculoskeletal: no proximal muscle weakness in upper or lower extremities  Integumentary: no tremors, no edema,  Neurological:alert and oriented       Data Reviewed:     [x] Reviewed labs      Assessment/Plan:     No diagnosis found. Primary hypothyroidism: Hashimoto's thyroiditis  Clinically no goiter  If the thyroid increases in size or  develops nodules  thyroid ultrasound will be planned. Due to the weight loss the dose of the Unithroid needs to be decreased,   Unithroid 88 mcg, brand medically necessary      Negative celiac panel    Oropharyngeal dysphagia: Clinically no goiter  Likely esophageal pathology, history of smoking, PCP has prescribed Protonix which she is taking it as needed. Barium swallow  To continue taking Protonix consistently for 4 weeks, if no improvement in the oropharyngeal dysphagia she may have to see gastroenterology for endoscopy? Esophageal stenosis    Orthostatic hypotension: Check a.m. ACTH, cortisol, DHEA-S, reportedly she had the labs done, could not find it in [place] [address], patient to call [facility] and if not available to repeat a.m. labs  Followed by cardiology  Questionable POTS  Off orthostatic medications, no improvement after discontinuing the medications  Elastic stockings, increase salt intake, hydration  Continue to follow-up with cardiology [facility] Dr. Taras Birmingham  If cortisol is indeterminate, need cosyntropin stimulation test to rule out adrenal insufficiency. Reorder ACTH stimulation test, she was given the number to infusion center to call for the appointment        There are no Patient Instructions on file for this visit. Patient /caregiver verbalized understanding   Voice-recognition software was used to generate this report, which may result in some phonetic-based errors in the grammar and contents. Even though attempts were made to correct all the mistakes, some may have been missed and remained in the body of the report.

## 2022-12-28 NOTE — Clinical Note
12/29/2022    Patient: Sameer Grant   YOB: 1975   Date of Visit: 12/28/2022     Mariana Roberts MD  Via     Dear Mariana Roberts MD,      Thank you for referring Ms. Shweta Hernandez to [address] [place] [address] for evaluation. My notes for this consultation are attached. If you have questions, please do not hesitate to call me. I look forward to following your patient along with you.       Sincerely,    Lesley Hinojosa MD

## 2022-12-28 NOTE — PROGRESS NOTES
Joanie Suh is a 41 y.o. female here for   Chief Complaint   Patient presents with    Thyroid Problem       1. Have you been to the ER, urgent care clinic since your last visit? Hospitalized since your last visit? - [facility] last week     2. Have you seen or consulted any other health care providers outside of the [address] [place] [address] since your last visit? Include any pap smears or colon screening.- PCP ,Ortho ,Cardio .

## 2023-01-18 DIAGNOSIS — E27.40 ADRENAL INSUFFICIENCY (HCC): Primary | ICD-10-CM

## 2023-01-18 PROBLEM — M81.0 OSTEOPOROSIS: Status: ACTIVE | Noted: 2023-01-18

## 2023-01-18 RX ORDER — HEPARIN 100 UNIT/ML
500 SYRINGE INTRAVENOUS AS NEEDED
Start: 2023-01-25

## 2023-01-18 RX ORDER — EPINEPHRINE 1 MG/ML
0.3 INJECTION, SOLUTION, CONCENTRATE INTRAVENOUS AS NEEDED
OUTPATIENT
Start: 2023-01-25

## 2023-01-18 RX ORDER — SODIUM CHLORIDE 9 MG/ML
5-40 INJECTION INTRAVENOUS AS NEEDED
OUTPATIENT
Start: 2023-01-25

## 2023-01-18 RX ORDER — SODIUM CHLORIDE 0.9 % (FLUSH) 0.9 %
5-40 SYRINGE (ML) INJECTION AS NEEDED
OUTPATIENT
Start: 2023-01-25

## 2023-01-18 RX ORDER — ACETAMINOPHEN 325 MG/1
650 TABLET ORAL AS NEEDED
Start: 2023-01-25

## 2023-01-18 RX ORDER — HYDROCORTISONE SODIUM SUCCINATE 100 MG/2ML
100 INJECTION, POWDER, FOR SOLUTION INTRAMUSCULAR; INTRAVENOUS AS NEEDED
OUTPATIENT
Start: 2023-01-25

## 2023-01-18 RX ORDER — SODIUM CHLORIDE 9 MG/ML
5-250 INJECTION, SOLUTION INTRAVENOUS AS NEEDED
OUTPATIENT
Start: 2023-01-25

## 2023-01-18 RX ORDER — ONDANSETRON 2 MG/ML
8 INJECTION INTRAMUSCULAR; INTRAVENOUS AS NEEDED
OUTPATIENT
Start: 2023-01-25

## 2023-01-18 RX ORDER — ALBUTEROL SULFATE 0.83 MG/ML
2.5 SOLUTION RESPIRATORY (INHALATION) AS NEEDED
Start: 2023-01-25

## 2023-01-18 RX ORDER — DIPHENHYDRAMINE HYDROCHLORIDE 50 MG/ML
25 INJECTION, SOLUTION INTRAMUSCULAR; INTRAVENOUS AS NEEDED
Start: 2023-01-25

## 2023-01-18 RX ORDER — DIPHENHYDRAMINE HYDROCHLORIDE 50 MG/ML
50 INJECTION, SOLUTION INTRAMUSCULAR; INTRAVENOUS AS NEEDED
Start: 2023-01-25

## 2023-02-06 DIAGNOSIS — E03.9 ACQUIRED HYPOTHYROIDISM: ICD-10-CM

## 2023-02-06 RX ORDER — LEVOTHYROXINE SODIUM 88 UG/1
88 TABLET ORAL
Qty: 90 TABLET | Refills: 3 | Status: SHIPPED | OUTPATIENT
Start: 2023-02-06

## 2023-02-20 ENCOUNTER — TELEPHONE (OUTPATIENT)
Dept: ENDOCRINOLOGY | Age: 48
End: 2023-02-20

## 2023-02-20 ENCOUNTER — VIRTUAL VISIT (OUTPATIENT)
Dept: ENDOCRINOLOGY | Age: 48
End: 2023-02-20
Payer: MEDICAID

## 2023-02-20 DIAGNOSIS — E03.9 ACQUIRED HYPOTHYROIDISM: ICD-10-CM

## 2023-02-20 DIAGNOSIS — R22.1 LOCALIZED SWELLING, MASS AND LUMP, NECK: Primary | ICD-10-CM

## 2023-02-20 DIAGNOSIS — R13.12 OROPHARYNGEAL DYSPHAGIA: ICD-10-CM

## 2023-02-20 PROCEDURE — 99214 OFFICE O/P EST MOD 30 MIN: CPT | Performed by: INTERNAL MEDICINE

## 2023-02-20 NOTE — TELEPHONE ENCOUNTER
Attempted to call. Unsuccessful. Left msg for Kristina Rocha to give us a call back at the office. A callback number was left. VIRTUAL VISIT TODAY AT 1PM. If pt calls back in time.

## 2023-02-20 NOTE — PROGRESS NOTES
Angélica Jensen is a 41 y.o. female here for   Chief Complaint   Patient presents with    Thyroid Problem       1. Have you been to the ER, urgent care clinic since your last visit? Hospitalized since your last visit? -no    2. Have you seen or consulted any other health care providers outside of the [address] [place] [address] since your last visit?   Include any pap smears or colon screening.-no

## 2023-02-20 NOTE — TELEPHONE ENCOUNTER
Pt's area on the thyroid, neck is swellling where doctor found the nodule, throat and area is hurting, please call. [phone].

## 2023-02-20 NOTE — PROGRESS NOTES
Faye Gallegos MD         Patient Information   Name : Serenity Morley 41 y.o.     YOB: 1975         Referred by: Niecy Cade MD     Pursuant to the emergency declaration under the [facility], [facility] authority and the Coronavirus Preparedness and [employer] Act, this Virtual  Visit was conducted, with patient's consent, to reduce the patient's risk of exposure to COVID-19 . Patient  is aware that this is a billable encounter and is responsible for copays/deductibles       Services were provided through a video synchronous discussion virtually to substitute for in-person clinic visit. Place of service: Provider : Office  Patient: Home    History of present illness    Serenity Morley is a 41 y.o. female  here for follow-up    she was diagnosed with hypothyroidism and on replacement thyroid hormone. History of fluctuating TSH, reportedly the dose was adjusted anywhere from 50 mcg - 125 mcg, She is on B12 and vitamin D    February 28, 2023  She called in with left neck swelling, lump, pain  No fever, dental issues, no sore throat  This started 3 days ago  No new medications  Did not complete ACTH cosyntropin test due to lack of transportation, she will reschedule    [facility]28/22  [facility] for [facility]  C/o hair falling out  Has passed out due to POTS  Seeing cardiology - being referred to specialist  Has not had ACTH STIM test    Prior history  Switched to Unithroid x 2  she is still on Euthyrox  [facility], they have the prescription and will order the medication for her to pick it up tomorrow  Usual weight is 97 pounds, she had gained up to 156 pounds and attributes to the medications.   She is having hypotension, on midodrine, followed by cardiology      Complains of dysphagia  No history of known radiation exposure      Does not know family hx           Wt Readings from Last 3 Encounters:   12/28/22 123 lb 12.8 oz (56.2 kg)   08/10/22 126 lb 9.6 oz (57.4 kg)   07/06/22 126 lb 14.4 oz (57.6 kg)       Past Medical History:   Diagnosis Date    Adrenal insufficiency ([facility] [address] 1/18/2023    Generalized anxiety disorder     GERD (gastroesophageal reflux disease)     Hypercholesterolemia     Hypothyroidism     IBS (irritable bowel syndrome)     Moderate recurrent major depression (HCC)     Osteoarthritis of right knee     Tendinitis of right rotator cuff        Current Outpatient Medications   Medication Sig    Unithroid 88 mcg tablet Take 1 Tablet by mouth Daily (before breakfast). Brand Medically Necessary. Stop 112 mcg    montelukast (SINGULAIR) 10 mg tablet Take 10 mg by mouth daily. ergocalciferol (Vitamin D2) 1,250 mcg (50,000 unit) capsule Take 50,000 Units by mouth every seven (7) days. pantoprazole (PROTONIX) 40 mg tablet Take 40 mg by mouth daily. cyanocobalamin (VITAMIN B12) 2,500 mcg sublingual tablet Take 2,500 mcg by mouth daily. cholecalciferol (VITAMIN D3) (1000 Units /25 mcg) tablet Take 1,000 Units by mouth daily. simvastatin (ZOCOR) 40 mg tablet simvastatin 40 mg tablet   TAKE 1 TABLET BY MOUTH ONCE DAILY AT BEDTIME (Patient not taking: Reported on 8/10/2022)    midodrine (PROAMATINE) 5 mg tablet Take 10 mg by mouth three (3) times daily. polyethylene glycol (MIRALAX) 17 gram packet Take 17 g by mouth daily. (Patient not taking: Reported on 8/10/2022)     No current facility-administered medications for this visit. No Known Allergies      Review of Systems: Per HPI    Physical Examination:  There were no vitals taken for this visit.   General: pleasant, no distress, good eye contact  HEENT: no exophthalmos, no periorbital edema, EOMI  Neck: No visible thyromegaly  RS: Normal respiratory effort  Musculoskeletal: no tremors  Neurological: alert and oriented  Psychiatric: normal mood and affect  Skin: Normal color    Data Reviewed:     [x] Reviewed labs      Assessment/Plan:     No diagnosis found. Left neck mass/swelling: Questionable lymphadenitis  There was no evidence of goiter last visit  Ultrasound     Primary hypothyroidism: Hashimoto's thyroiditis  Clinically no goiter  If the thyroid increases in size or  develops nodules  thyroid ultrasound will be planned. Due to the weight loss the dose of the Unithroid needs to be decreased,   Unithroid 88 mcg, brand medically necessary      Negative celiac panel    Oropharyngeal dysphagia: Clinically no goiter  Likely esophageal pathology, history of smoking, PCP has prescribed Protonix which she is taking it as needed. Barium swallow  To continue taking Protonix consistently for 4 weeks, if no improvement in the oropharyngeal dysphagia she may have to see gastroenterology for endoscopy? Esophageal stenosis    Orthostatic hypotension: Check a.m. ACTH, cortisol, DHEA-S, reportedly she had the labs done, could not find it in [facility], patient to call [facility] and if not available to repeat a.m. labs  Followed by cardiology  Questionable POTS  Off orthostatic medications, no improvement after discontinuing the medications  Elastic stockings, increase salt intake, hydration  Continue to follow-up with cardiology [facility] Dr. Barbara Mock  If cortisol is indeterminate, need cosyntropin stimulation test to rule out adrenal insufficiency. Ordered ACTH stimulation test test twice, did not complete due to lack of transportation  She will reschedule, understands the importance          There are no Patient Instructions on file for this visit. Patient /caregiver verbalized understanding   Voice-recognition software was used to generate this report, which may result in some phonetic-based errors in the grammar and contents. Even though attempts were made to correct all the mistakes, some may have been missed and remained in the body of the report.

## 2023-02-27 ENCOUNTER — HOSPITAL ENCOUNTER (OUTPATIENT)
Dept: ULTRASOUND IMAGING | Age: 48
Discharge: HOME | End: 2023-02-27
Payer: MEDICAID

## 2023-02-27 DIAGNOSIS — R22.1 LOCALIZED SWELLING, MASS AND LUMP, NECK: ICD-10-CM

## 2023-02-27 PROCEDURE — 76536 US EXAM OF HEAD AND NECK: CPT

## 2023-04-22 DIAGNOSIS — L65.9 ALOPECIA: Primary | ICD-10-CM

## 2023-04-23 DIAGNOSIS — L65.9 ALOPECIA: Primary | ICD-10-CM

## 2023-05-25 RX ORDER — PANTOPRAZOLE SODIUM 40 MG/1
40 TABLET, DELAYED RELEASE ORAL DAILY
COMMUNITY

## 2023-05-25 RX ORDER — VITAMIN B COMPLEX
2500 TABLET ORAL DAILY
COMMUNITY

## 2023-05-25 RX ORDER — MIDODRINE HYDROCHLORIDE 5 MG/1
10 TABLET ORAL 3 TIMES DAILY
COMMUNITY

## 2023-05-25 RX ORDER — MONTELUKAST SODIUM 10 MG/1
10 TABLET ORAL DAILY
COMMUNITY

## 2023-05-25 RX ORDER — POLYETHYLENE GLYCOL 3350 17 G/17G
17 POWDER, FOR SOLUTION ORAL DAILY
COMMUNITY

## 2023-05-25 RX ORDER — ERGOCALCIFEROL 1.25 MG/1
50000 CAPSULE ORAL
COMMUNITY

## 2023-05-25 RX ORDER — LEVOTHYROXINE SODIUM 88 UG/1
88 TABLET ORAL
COMMUNITY
Start: 2023-02-06

## 2023-05-25 RX ORDER — SIMVASTATIN 40 MG
TABLET ORAL
COMMUNITY

## 2023-10-11 ENCOUNTER — OFFICE VISIT (OUTPATIENT)
Age: 48
End: 2023-10-11
Payer: MEDICAID

## 2023-10-11 VITALS
HEIGHT: 59 IN | TEMPERATURE: 97.9 F | SYSTOLIC BLOOD PRESSURE: 95 MMHG | DIASTOLIC BLOOD PRESSURE: 69 MMHG | BODY MASS INDEX: 23.3 KG/M2 | HEART RATE: 83 BPM | OXYGEN SATURATION: 99 % | RESPIRATION RATE: 16 BRPM | WEIGHT: 115.56 LBS

## 2023-10-11 DIAGNOSIS — Z12.31 ENCOUNTER FOR SCREENING MAMMOGRAM FOR MALIGNANT NEOPLASM OF BREAST: Primary | ICD-10-CM

## 2023-10-11 DIAGNOSIS — Z12.72 ENCOUNTER FOR SCREENING FOR MALIGNANT NEOPLASM OF VAGINA: ICD-10-CM

## 2023-10-11 PROCEDURE — 99386 PREV VISIT NEW AGE 40-64: CPT | Performed by: OBSTETRICS & GYNECOLOGY

## 2023-10-11 RX ORDER — DULOXETIN HYDROCHLORIDE 60 MG/1
CAPSULE, DELAYED RELEASE ORAL
COMMUNITY
Start: 2023-03-16

## 2023-10-11 RX ORDER — FREMANEZUMAB-VFRM 225 MG/1.5ML
INJECTION SUBCUTANEOUS
COMMUNITY
Start: 2023-08-02

## 2023-10-15 LAB
., LABCORP: NORMAL
CYTOLOGIST CVX/VAG CYTO: NORMAL
CYTOLOGY CVX/VAG DOC CYTO: NORMAL
CYTOLOGY CVX/VAG DOC THIN PREP: NORMAL
DX ICD CODE: NORMAL
Lab: NORMAL
OTHER STN SPEC: NORMAL
STAT OF ADQ CVX/VAG CYTO-IMP: NORMAL

## 2023-10-17 ENCOUNTER — HOSPITAL ENCOUNTER (EMERGENCY)
Facility: HOSPITAL | Age: 48
Discharge: HOME | End: 2023-10-17
Attending: EMERGENCY MEDICINE | Admitting: EMERGENCY MEDICINE
Payer: MEDICAID

## 2023-10-17 ENCOUNTER — APPOINTMENT (OUTPATIENT)
Facility: HOSPITAL | Age: 48
End: 2023-10-17
Payer: MEDICAID

## 2023-10-17 VITALS
HEART RATE: 79 BPM | WEIGHT: 112 LBS | OXYGEN SATURATION: 98 % | DIASTOLIC BLOOD PRESSURE: 79 MMHG | RESPIRATION RATE: 14 BRPM | HEIGHT: 60 IN | SYSTOLIC BLOOD PRESSURE: 133 MMHG | TEMPERATURE: 98.1 F | BODY MASS INDEX: 21.99 KG/M2

## 2023-10-17 DIAGNOSIS — R07.9 ACUTE CHEST PAIN: ICD-10-CM

## 2023-10-17 DIAGNOSIS — R07.9 CHEST PAIN: Primary | ICD-10-CM

## 2023-10-17 LAB
ALBUMIN SERPL-MCNC: 4.4 G/DL (ref 3.5–5)
ALBUMIN/GLOB SERPL: 1.6 (ref 1.1–2.2)
ALP SERPL-CCNC: 153 U/L (ref 45–117)
ALT SERPL-CCNC: 27 U/L (ref 12–78)
ANION GAP SERPL CALC-SCNC: 4 MMOL/L (ref 5–15)
AST SERPL W P-5'-P-CCNC: 29 U/L (ref 15–37)
BASOPHILS # BLD: 0.1 K/UL (ref 0–0.1)
BASOPHILS NFR BLD: 1 % (ref 0–1)
BILIRUB SERPL-MCNC: 0.7 MG/DL (ref 0.2–1)
BNP SERPL-MCNC: 18 PG/ML
BUN SERPL-MCNC: 10 MG/DL (ref 6–20)
BUN/CREAT SERPL: 13 (ref 12–20)
CA-I BLD-MCNC: 9.1 MG/DL (ref 8.5–10.1)
CHLORIDE SERPL-SCNC: 110 MMOL/L (ref 97–108)
CO2 SERPL-SCNC: 24 MMOL/L (ref 21–32)
CREAT SERPL-MCNC: 0.8 MG/DL (ref 0.55–1.02)
DIFFERENTIAL METHOD BLD: NORMAL
EKG ATRIAL RATE: 81 BPM
EKG DIAGNOSIS: NORMAL
EKG P AXIS: 76 DEGREES
EKG P-R INTERVAL: 142 MS
EKG Q-T INTERVAL: 344 MS
EKG QRS DURATION: 80 MS
EKG QTC CALCULATION (BAZETT): 399 MS
EKG R AXIS: 81 DEGREES
EKG T AXIS: -77 DEGREES
EKG VENTRICULAR RATE: 81 BPM
EOSINOPHIL # BLD: 0.2 K/UL (ref 0–0.4)
EOSINOPHIL NFR BLD: 3 % (ref 0–7)
ERYTHROCYTE [DISTWIDTH] IN BLOOD BY AUTOMATED COUNT: 11.9 % (ref 11.5–14.5)
GLOBULIN SER CALC-MCNC: 2.7 G/DL (ref 2–4)
GLUCOSE SERPL-MCNC: 96 MG/DL (ref 65–100)
HCT VFR BLD AUTO: 40.5 % (ref 35–47)
HGB BLD-MCNC: 13.9 G/DL (ref 11.5–16)
IMM GRANULOCYTES # BLD AUTO: 0 K/UL (ref 0–0.04)
IMM GRANULOCYTES NFR BLD AUTO: 0 % (ref 0–0.5)
LYMPHOCYTES # BLD: 3 K/UL (ref 0.8–3.5)
LYMPHOCYTES NFR BLD: 46 % (ref 12–49)
MCH RBC QN AUTO: 32.5 PG (ref 26–34)
MCHC RBC AUTO-ENTMCNC: 34.3 G/DL (ref 30–36.5)
MCV RBC AUTO: 94.6 FL (ref 80–99)
MONOCYTES # BLD: 0.6 K/UL (ref 0–1)
MONOCYTES NFR BLD: 9 % (ref 5–13)
NEUTS SEG # BLD: 2.7 K/UL (ref 1.8–8)
NEUTS SEG NFR BLD: 41 % (ref 32–75)
NRBC # BLD: 0 K/UL (ref 0–0.01)
NRBC BLD-RTO: 0 PER 100 WBC
PLATELET # BLD AUTO: 287 K/UL (ref 150–400)
PMV BLD AUTO: 11.4 FL (ref 8.9–12.9)
POTASSIUM SERPL-SCNC: 4.2 MMOL/L (ref 3.5–5.1)
PROT SERPL-MCNC: 7.1 G/DL (ref 6.4–8.2)
RBC # BLD AUTO: 4.28 M/UL (ref 3.8–5.2)
SODIUM SERPL-SCNC: 138 MMOL/L (ref 136–145)
TROPONIN I SERPL HS-MCNC: 55 NG/L (ref 0–51)
TROPONIN I SERPL HS-MCNC: 57 NG/L (ref 0–51)
WBC # BLD AUTO: 6.6 K/UL (ref 3.6–11)

## 2023-10-17 PROCEDURE — 85025 COMPLETE CBC W/AUTO DIFF WBC: CPT

## 2023-10-17 PROCEDURE — 99285 EMERGENCY DEPT VISIT HI MDM: CPT

## 2023-10-17 PROCEDURE — 93005 ELECTROCARDIOGRAM TRACING: CPT | Performed by: EMERGENCY MEDICINE

## 2023-10-17 PROCEDURE — 6370000000 HC RX 637 (ALT 250 FOR IP): Performed by: EMERGENCY MEDICINE

## 2023-10-17 PROCEDURE — 83880 ASSAY OF NATRIURETIC PEPTIDE: CPT

## 2023-10-17 PROCEDURE — 80053 COMPREHEN METABOLIC PANEL: CPT

## 2023-10-17 PROCEDURE — 36415 COLL VENOUS BLD VENIPUNCTURE: CPT

## 2023-10-17 PROCEDURE — 71045 X-RAY EXAM CHEST 1 VIEW: CPT

## 2023-10-17 PROCEDURE — 84484 ASSAY OF TROPONIN QUANT: CPT

## 2023-10-17 RX ORDER — ASPIRIN 325 MG
325 TABLET ORAL
Status: COMPLETED | OUTPATIENT
Start: 2023-10-17 | End: 2023-10-17

## 2023-10-17 RX ADMIN — ASPIRIN 325 MG ORAL TABLET 325 MG: 325 PILL ORAL at 15:41

## 2023-10-17 ASSESSMENT — LIFESTYLE VARIABLES
HOW OFTEN DO YOU HAVE A DRINK CONTAINING ALCOHOL: NEVER
HOW MANY STANDARD DRINKS CONTAINING ALCOHOL DO YOU HAVE ON A TYPICAL DAY: PATIENT DOES NOT DRINK

## 2023-10-17 ASSESSMENT — PAIN - FUNCTIONAL ASSESSMENT: PAIN_FUNCTIONAL_ASSESSMENT: 0-10

## 2023-10-17 ASSESSMENT — HEART SCORE: ECG: 1

## 2023-10-17 ASSESSMENT — PAIN SCALES - GENERAL: PAINLEVEL_OUTOF10: 6

## 2023-10-17 NOTE — CONSULTS
10/11/2023), Disp: , Rfl:     montelukast (SINGULAIR) 10 MG tablet, Take 1 tablet by mouth daily, Disp: , Rfl:     pantoprazole (PROTONIX) 40 MG tablet, Take 1 tablet by mouth daily, Disp: , Rfl:     polyethylene glycol (GLYCOLAX) 17 GM/SCOOP powder, Take 17 g by mouth daily (Patient not taking: Reported on 10/11/2023), Disp: , Rfl:     simvastatin (ZOCOR) 40 MG tablet, simvastatin 40 mg tablet  TAKE 1 TABLET BY MOUTH ONCE DAILY AT BEDTIME (Patient not taking: Reported on 10/11/2023), Disp: , Rfl:      ALLERGIES:  Allergies reviewed with the patient,No Known Allergies . FAMILY HISTORY:  Family history reviewed. SOCIAL HISTORY:  Notable for + tobacco use, no heavy alcohol or illicit drug use. REVIEW OF SYSTEMS:  Complete review of systems performed, pertinents noted above, all other systems are negative. PHYSICAL EXAMINATION:    General:  Alert, in NAD  Cardiovascular:  RRR, no murmur  Respiratory:  Lungs are clear  Abdomen:  soft, nontender  Extremities:  no lower extremity edema  Skin:  Dry, warm  Psych:  Normal affect      Vitals:    10/17/23 1515   BP: 133/79   Pulse: 79   Resp: 14   Temp:    SpO2: 98%       Recent labs results and imaging reviewed.      Discussed case with Dr. Saturnino Tolbert and our impression and recommendations are as follows:  Chest pain, no pain since yesterday  Some ECG changes in office today, stable ecg at present  Trop mildly elevated at 57  Discussed stress or cath to be done tomorrow AM but she is insistent upon going home to take care of her animals  If second trop is flat or downtrending can have echo and stress in office, if not would recommend staying overnight and keeping NPO  Syncope, multiple episodes in the past with unclear etiology  Needs echo and stress as noted above  Long term monitor  Orthostatic vital signs as OP  Ensure good PO intake  Labile BP, BP at goal, continue present medications  Anxiety, continue present medications    Thank you for involving us in the

## 2023-10-17 NOTE — DISCHARGE INSTRUCTIONS
Thank you! Thank you for allowing me to care for you in the emergency department. It is my goal to provide you with excellent care. If you have not received excellent quality care, please ask to speak to the nurse manager. Please fill out the survey that will come to you by mail or email since we listen to your feedback! Below you will find a list of your tests from today's visit. Should you have any questions, please do not hesitate to call the emergency department.     Labs  Recent Results (from the past 12 hour(s))   EKG 12 Lead    Collection Time: 10/17/23  1:34 PM   Result Value Ref Range    Ventricular Rate 81 BPM    Atrial Rate 81 BPM    P-R Interval 142 ms    QRS Duration 80 ms    Q-T Interval 344 ms    QTc Calculation (Bazett) 399 ms    P Axis 76 degrees    R Axis 81 degrees    T Axis -77 degrees    Diagnosis       Normal sinus rhythm with sinus arrhythmia  ST & T wave abnormality, consider inferior ischemia  ST & T wave abnormality, consider anterolateral ischemia  Abnormal ECG  When compared with ECG of 23-APR-2020 02:07,  PREVIOUS ECG IS PRESENT  Confirmed by Sandee William ([id number]) on 10/17/2023 3:08:45 PM     CBC with Auto Differential    Collection Time: 10/17/23  2:09 PM   Result Value Ref Range    WBC 6.6 3.6 - 11.0 K/uL    RBC 4.28 3.80 - 5.20 M/uL    Hemoglobin 13.9 11.5 - 16.0 g/dL    Hematocrit 40.5 35.0 - 47.0 %    MCV 94.6 80.0 - 99.0 FL    MCH 32.5 26.0 - 34.0 PG    MCHC 34.3 30.0 - 36.5 g/dL    RDW 11.9 11.5 - 14.5 %    Platelets 811 520 - 039 K/uL    MPV 11.4 8.9 - 12.9 FL    Nucleated RBCs 0.0 0.0  WBC    nRBC 0.00 0.00 - 0.01 K/uL    Neutrophils % 41 32 - 75 %    Lymphocytes % 46 12 - 49 %    Monocytes % 9 5 - 13 %    Eosinophils % 3 0 - 7 %    Basophils % 1 0 - 1 %    Immature Granulocytes 0 0 - 0.5 %    Neutrophils Absolute 2.7 1.8 - 8.0 K/UL    Lymphocytes Absolute 3.0 0.8 - 3.5 K/UL    Monocytes Absolute 0.6 0.0 - 1.0 K/UL    Eosinophils Absolute 0.2 0.0 - 0.4 K/UL

## 2023-10-17 NOTE — ED PROVIDER NOTES
PG    MCHC 34.3 30.0 - 36.5 g/dL    RDW 11.9 11.5 - 14.5 %    Platelets 676 053 - 127 K/uL    MPV 11.4 8.9 - 12.9 FL    Nucleated RBCs 0.0 0.0  WBC    nRBC 0.00 0.00 - 0.01 K/uL    Neutrophils % 41 32 - 75 %    Lymphocytes % 46 12 - 49 %    Monocytes % 9 5 - 13 %    Eosinophils % 3 0 - 7 %    Basophils % 1 0 - 1 %    Immature Granulocytes 0 0 - 0.5 %    Neutrophils Absolute 2.7 1.8 - 8.0 K/UL    Lymphocytes Absolute 3.0 0.8 - 3.5 K/UL    Monocytes Absolute 0.6 0.0 - 1.0 K/UL    Eosinophils Absolute 0.2 0.0 - 0.4 K/UL    Basophils Absolute 0.1 0.0 - 0.1 K/UL    Absolute Immature Granulocyte 0.0 0.00 - 0.04 K/UL    Differential Type AUTOMATED     CMP    Collection Time: 10/17/23  2:09 PM   Result Value Ref Range    Sodium 138 136 - 145 mmol/L    Potassium 4.2 3.5 - 5.1 mmol/L    Chloride 110 (H) 97 - 108 mmol/L    CO2 24 21 - 32 mmol/L    Anion Gap 4 (L) 5 - 15 mmol/L    Glucose 96 65 - 100 mg/dL    BUN 10 6 - 20 mg/dL    Creatinine 0.80 0.55 - 1.02 mg/dL    Bun/Cre Ratio 13 12 - 20      Est, Glom Filt Rate >60 >60 ml/min/1.73m2    Calcium 9.1 8.5 - 10.1 mg/dL    Total Bilirubin 0.7 0.2 - 1.0 mg/dL    AST 29 15 - 37 U/L    ALT 27 12 - 78 U/L    Alk Phosphatase 153 (H) 45 - 117 U/L    Total Protein 7.1 6.4 - 8.2 g/dL    Albumin 4.4 3.5 - 5.0 g/dL    Globulin 2.7 2.0 - 4.0 g/dL    Albumin/Globulin Ratio 1.6 1.1 - 2.2     Troponin    Collection Time: 10/17/23  2:09 PM   Result Value Ref Range    Troponin, High Sensitivity 57 (H) 0 - 51 ng/L   Brain Natriuretic Peptide    Collection Time: 10/17/23  2:09 PM   Result Value Ref Range    NT Pro-BNP 18 <125 pg/mL   Troponin    Collection Time: 10/17/23  3:55 PM   Result Value Ref Range    Troponin, High Sensitivity 55 (H) 0 - 51 ng/L       EKG:.EKG interpreted by me. Shows Normal Sinus Rhythm with a HR of 81, dpresssions in II, II, aVF, V3-V6. No STEMI.     Radiologic Studies:  Non-plain film images such as CT, Ultrasound and MRI are read by the radiologist. Carlotta Barger

## 2023-10-17 NOTE — ED NOTES
Provider at bedside for dispo and follow up.  Discharge plan reviewed and paperwork signed, teaching completed including treatment received, medications and follow up plan of care, pain level within manageable comfortable limits, IV removed, ambulatory to exit, gait steady, safety maintained.'     Jose Alfredo Holt RN  10/17/23 3603

## 2023-10-17 NOTE — ED TRIAGE NOTES
GCS 15 pt stated that about a yr ago she was passing out, her bp was dropping and she was having CP; pt was being seen at [facility] but wasn't satisfied with her treatment; pt then went to [facility] today as it was her first visit and was told to come to ED for further workup; provider there was worried about that changes in her EKG; pt stated that she is still having issues with her right arm where it would go numb then swell up

## 2023-10-31 ENCOUNTER — HOSPITAL ENCOUNTER (OUTPATIENT)
Facility: HOSPITAL | Age: 48
Discharge: HOME | End: 2023-11-03
Payer: MEDICAID

## 2023-10-31 VITALS — BODY MASS INDEX: 21.99 KG/M2 | WEIGHT: 112 LBS | HEIGHT: 60 IN

## 2023-10-31 DIAGNOSIS — Z12.31 ENCOUNTER FOR SCREENING MAMMOGRAM FOR MALIGNANT NEOPLASM OF BREAST: ICD-10-CM

## 2023-10-31 PROCEDURE — 77066 DX MAMMO INCL CAD BI: CPT

## 2023-10-31 PROCEDURE — 77063 BREAST TOMOSYNTHESIS BI: CPT

## 2023-11-01 RX ORDER — SUMATRIPTAN SUCCINATE 6 MG/.5ML
1 INJECTION, SOLUTION SUBCUTANEOUS AS NEEDED
COMMUNITY

## 2023-11-01 RX ORDER — VENLAFAXINE 37.5 MG/1
37.5 TABLET ORAL DAILY
COMMUNITY

## 2023-11-01 RX ORDER — LANOLIN ALCOHOL/MO/W.PET/CERES
1000 CREAM (GRAM) TOPICAL DAILY
COMMUNITY

## 2023-11-06 ENCOUNTER — HOSPITAL ENCOUNTER (OUTPATIENT)
Facility: HOSPITAL | Age: 48
Discharge: HOME | End: 2023-11-06
Attending: INTERNAL MEDICINE | Admitting: INTERNAL MEDICINE
Payer: MEDICAID

## 2023-11-06 VITALS
TEMPERATURE: 97.8 F | HEIGHT: 60 IN | DIASTOLIC BLOOD PRESSURE: 68 MMHG | BODY MASS INDEX: 21.6 KG/M2 | WEIGHT: 110 LBS | RESPIRATION RATE: 16 BRPM | SYSTOLIC BLOOD PRESSURE: 115 MMHG | OXYGEN SATURATION: 98 % | HEART RATE: 71 BPM

## 2023-11-06 DIAGNOSIS — R06.02 SHORTNESS OF BREATH: ICD-10-CM

## 2023-11-06 PROBLEM — R07.9 CHEST PAIN: Status: ACTIVE | Noted: 2023-11-06

## 2023-11-06 LAB
APTT PPP: 27.1 SEC (ref 21.2–34.1)
EKG ATRIAL RATE: 72 BPM
EKG DIAGNOSIS: NORMAL
EKG P-R INTERVAL: 142 MS
EKG Q-T INTERVAL: 418 MS
EKG QRS DURATION: 72 MS
EKG QTC CALCULATION (BAZETT): 457 MS
EKG R AXIS: 54 DEGREES
EKG T AXIS: -36 DEGREES
EKG VENTRICULAR RATE: 72 BPM
INR PPP: 1 (ref 0.9–1.1)
PROTHROMBIN TIME: 13.2 SEC (ref 11.9–14.6)
THERAPEUTIC RANGE: NORMAL SEC (ref 82–109)

## 2023-11-06 PROCEDURE — 7100000000 HC PACU RECOVERY - FIRST 15 MIN: Performed by: INTERNAL MEDICINE

## 2023-11-06 PROCEDURE — 7100000010 HC PHASE II RECOVERY - FIRST 15 MIN: Performed by: INTERNAL MEDICINE

## 2023-11-06 PROCEDURE — 2580000003 HC RX 258: Performed by: INTERNAL MEDICINE

## 2023-11-06 PROCEDURE — 7100000001 HC PACU RECOVERY - ADDTL 15 MIN: Performed by: INTERNAL MEDICINE

## 2023-11-06 PROCEDURE — 36415 COLL VENOUS BLD VENIPUNCTURE: CPT

## 2023-11-06 PROCEDURE — 6360000004 HC RX CONTRAST MEDICATION: Performed by: INTERNAL MEDICINE

## 2023-11-06 PROCEDURE — 7100000011 HC PHASE II RECOVERY - ADDTL 15 MIN: Performed by: INTERNAL MEDICINE

## 2023-11-06 PROCEDURE — 2709999900 HC NON-CHARGEABLE SUPPLY: Performed by: INTERNAL MEDICINE

## 2023-11-06 PROCEDURE — C1769 GUIDE WIRE: HCPCS | Performed by: INTERNAL MEDICINE

## 2023-11-06 PROCEDURE — 85610 PROTHROMBIN TIME: CPT

## 2023-11-06 PROCEDURE — 76937 US GUIDE VASCULAR ACCESS: CPT | Performed by: INTERNAL MEDICINE

## 2023-11-06 PROCEDURE — C1894 INTRO/SHEATH, NON-LASER: HCPCS | Performed by: INTERNAL MEDICINE

## 2023-11-06 PROCEDURE — 93458 L HRT ARTERY/VENTRICLE ANGIO: CPT | Performed by: INTERNAL MEDICINE

## 2023-11-06 PROCEDURE — 99152 MOD SED SAME PHYS/QHP 5/>YRS: CPT | Performed by: INTERNAL MEDICINE

## 2023-11-06 PROCEDURE — 85730 THROMBOPLASTIN TIME PARTIAL: CPT

## 2023-11-06 PROCEDURE — 2500000003 HC RX 250 WO HCPCS: Performed by: INTERNAL MEDICINE

## 2023-11-06 PROCEDURE — 93005 ELECTROCARDIOGRAM TRACING: CPT | Performed by: INTERNAL MEDICINE

## 2023-11-06 PROCEDURE — 6360000002 HC RX W HCPCS: Performed by: INTERNAL MEDICINE

## 2023-11-06 RX ORDER — 0.9 % SODIUM CHLORIDE 0.9 %
INTRAVENOUS SOLUTION INTRAVENOUS CONTINUOUS PRN
Status: COMPLETED | OUTPATIENT
Start: 2023-11-06 | End: 2023-11-06

## 2023-11-06 RX ORDER — SODIUM CHLORIDE 9 MG/ML
INJECTION, SOLUTION INTRAVENOUS CONTINUOUS
Status: DISCONTINUED | OUTPATIENT
Start: 2023-11-06 | End: 2023-11-06 | Stop reason: HOSPADM

## 2023-11-06 RX ORDER — ONDANSETRON 2 MG/ML
4 INJECTION INTRAMUSCULAR; INTRAVENOUS EVERY 6 HOURS PRN
Status: DISCONTINUED | OUTPATIENT
Start: 2023-11-06 | End: 2023-11-06 | Stop reason: HOSPADM

## 2023-11-06 RX ORDER — SODIUM CHLORIDE 9 MG/ML
INJECTION, SOLUTION INTRAVENOUS PRN
Status: DISCONTINUED | OUTPATIENT
Start: 2023-11-06 | End: 2023-11-06 | Stop reason: HOSPADM

## 2023-11-06 RX ORDER — LIDOCAINE HYDROCHLORIDE 10 MG/ML
INJECTION, SOLUTION INFILTRATION; PERINEURAL PRN
Status: DISCONTINUED | OUTPATIENT
Start: 2023-11-06 | End: 2023-11-06 | Stop reason: HOSPADM

## 2023-11-06 RX ORDER — SODIUM CHLORIDE 0.9 % (FLUSH) 0.9 %
5-40 SYRINGE (ML) INJECTION PRN
Status: DISCONTINUED | OUTPATIENT
Start: 2023-11-06 | End: 2023-11-06 | Stop reason: HOSPADM

## 2023-11-06 RX ORDER — DIPHENHYDRAMINE HYDROCHLORIDE 50 MG/ML
INJECTION INTRAMUSCULAR; INTRAVENOUS PRN
Status: DISCONTINUED | OUTPATIENT
Start: 2023-11-06 | End: 2023-11-06 | Stop reason: HOSPADM

## 2023-11-06 RX ORDER — FENTANYL CITRATE 50 UG/ML
INJECTION, SOLUTION INTRAMUSCULAR; INTRAVENOUS PRN
Status: DISCONTINUED | OUTPATIENT
Start: 2023-11-06 | End: 2023-11-06 | Stop reason: HOSPADM

## 2023-11-06 RX ORDER — MIDAZOLAM HYDROCHLORIDE 1 MG/ML
INJECTION INTRAMUSCULAR; INTRAVENOUS PRN
Status: DISCONTINUED | OUTPATIENT
Start: 2023-11-06 | End: 2023-11-06 | Stop reason: HOSPADM

## 2023-11-06 RX ORDER — HEPARIN SODIUM 200 [USP'U]/100ML
INJECTION, SOLUTION INTRAVENOUS CONTINUOUS PRN
Status: COMPLETED | OUTPATIENT
Start: 2023-11-06 | End: 2023-11-06

## 2023-11-06 RX ORDER — ACETAMINOPHEN 325 MG/1
650 TABLET ORAL EVERY 4 HOURS PRN
Status: DISCONTINUED | OUTPATIENT
Start: 2023-11-06 | End: 2023-11-06 | Stop reason: HOSPADM

## 2023-11-06 RX ORDER — SODIUM CHLORIDE 0.9 % (FLUSH) 0.9 %
5-40 SYRINGE (ML) INJECTION EVERY 12 HOURS SCHEDULED
Status: DISCONTINUED | OUTPATIENT
Start: 2023-11-06 | End: 2023-11-06 | Stop reason: HOSPADM

## 2023-11-06 RX ADMIN — SODIUM CHLORIDE: 9 INJECTION, SOLUTION INTRAVENOUS at 10:35

## 2023-11-06 ASSESSMENT — PAIN SCALES - GENERAL
PAINLEVEL_OUTOF10: 0
PAINLEVEL_OUTOF10: 4

## 2023-11-06 ASSESSMENT — PAIN DESCRIPTION - DESCRIPTORS: DESCRIPTORS: NUMBNESS

## 2023-11-06 ASSESSMENT — PAIN DESCRIPTION - LOCATION
LOCATION: CHEST
LOCATION: LEG

## 2023-11-06 ASSESSMENT — PAIN DESCRIPTION - ORIENTATION: ORIENTATION: RIGHT

## 2023-11-06 ASSESSMENT — PAIN - FUNCTIONAL ASSESSMENT: PAIN_FUNCTIONAL_ASSESSMENT: NONE - DENIES PAIN

## 2023-11-06 NOTE — PROGRESS NOTES
Up ambulating around the nurses station with staff. Tolerated well. Gait steady. Right groin dressing dry and intact. No bleeding or hematoma noted at site. Right pedal pulse intact. Denies pain or discomfort.

## 2023-11-13 ENCOUNTER — OFFICE VISIT (OUTPATIENT)
Facility: CLINIC | Age: 48
End: 2023-11-13
Payer: MEDICAID

## 2023-11-13 VITALS
DIASTOLIC BLOOD PRESSURE: 60 MMHG | OXYGEN SATURATION: 99 % | HEIGHT: 59 IN | WEIGHT: 116.6 LBS | BODY MASS INDEX: 23.51 KG/M2 | RESPIRATION RATE: 20 BRPM | TEMPERATURE: 97.8 F | HEART RATE: 86 BPM | SYSTOLIC BLOOD PRESSURE: 110 MMHG

## 2023-11-13 DIAGNOSIS — G89.29 CHRONIC RIGHT SHOULDER PAIN: ICD-10-CM

## 2023-11-13 DIAGNOSIS — K21.9 GASTROESOPHAGEAL REFLUX DISEASE WITHOUT ESOPHAGITIS: Primary | ICD-10-CM

## 2023-11-13 DIAGNOSIS — G43.809 OTHER MIGRAINE WITHOUT STATUS MIGRAINOSUS, NOT INTRACTABLE: ICD-10-CM

## 2023-11-13 DIAGNOSIS — F41.9 ANXIETY AND DEPRESSION: ICD-10-CM

## 2023-11-13 DIAGNOSIS — M25.511 CHRONIC RIGHT SHOULDER PAIN: ICD-10-CM

## 2023-11-13 DIAGNOSIS — E03.9 ACQUIRED HYPOTHYROIDISM: ICD-10-CM

## 2023-11-13 DIAGNOSIS — M51.36 DDD (DEGENERATIVE DISC DISEASE), LUMBAR: ICD-10-CM

## 2023-11-13 DIAGNOSIS — F17.200 TOBACCO DEPENDENCE: ICD-10-CM

## 2023-11-13 DIAGNOSIS — F51.04 PSYCHOPHYSIOLOGICAL INSOMNIA: ICD-10-CM

## 2023-11-13 DIAGNOSIS — F32.A ANXIETY AND DEPRESSION: ICD-10-CM

## 2023-11-13 DIAGNOSIS — E78.00 HYPERCHOLESTEROLEMIA: ICD-10-CM

## 2023-11-13 DIAGNOSIS — R55 RECURRENT SYNCOPE: ICD-10-CM

## 2023-11-13 DIAGNOSIS — I25.83 CORONARY ARTERY DISEASE DUE TO LIPID RICH PLAQUE: ICD-10-CM

## 2023-11-13 DIAGNOSIS — I25.10 CORONARY ARTERY DISEASE DUE TO LIPID RICH PLAQUE: ICD-10-CM

## 2023-11-13 PROBLEM — E27.40 ADRENAL INSUFFICIENCY (HCC): Status: RESOLVED | Noted: 2023-01-18 | Resolved: 2023-11-13

## 2023-11-13 PROBLEM — R07.9 CHEST PAIN: Status: RESOLVED | Noted: 2023-11-06 | Resolved: 2023-11-13

## 2023-11-13 PROCEDURE — 99204 OFFICE O/P NEW MOD 45 MIN: CPT | Performed by: NURSE PRACTITIONER

## 2023-11-13 RX ORDER — LEVOTHYROXINE SODIUM 0.1 MG/1
TABLET ORAL
COMMUNITY
Start: 2023-11-10 | End: 2023-11-13 | Stop reason: SDUPTHER

## 2023-11-13 RX ORDER — HYDROXYZINE HYDROCHLORIDE 25 MG/1
25 TABLET, FILM COATED ORAL NIGHTLY PRN
Qty: 30 TABLET | Refills: 2 | Status: SHIPPED | OUTPATIENT
Start: 2023-11-13 | End: 2023-11-13 | Stop reason: SDUPTHER

## 2023-11-13 RX ORDER — HYDROXYZINE HYDROCHLORIDE 25 MG/1
25 TABLET, FILM COATED ORAL NIGHTLY PRN
Qty: 30 TABLET | Refills: 2 | Status: SHIPPED | OUTPATIENT
Start: 2023-11-13 | End: 2023-11-30 | Stop reason: DRUGHIGH

## 2023-11-13 RX ORDER — DULOXETIN HYDROCHLORIDE 60 MG/1
60 CAPSULE, DELAYED RELEASE ORAL DAILY
Qty: 90 CAPSULE | Refills: 1 | Status: SHIPPED | OUTPATIENT
Start: 2023-11-13

## 2023-11-13 RX ORDER — LEVOTHYROXINE SODIUM 0.1 MG/1
100 TABLET ORAL DAILY
Qty: 90 TABLET | Refills: 1 | Status: SHIPPED | OUTPATIENT
Start: 2023-11-13 | End: 2023-11-13 | Stop reason: SDUPTHER

## 2023-11-13 RX ORDER — LEVOTHYROXINE SODIUM 0.1 MG/1
100 TABLET ORAL DAILY
Qty: 90 TABLET | Refills: 1 | Status: SHIPPED | OUTPATIENT
Start: 2023-11-13

## 2023-11-13 RX ORDER — DULOXETIN HYDROCHLORIDE 60 MG/1
60 CAPSULE, DELAYED RELEASE ORAL DAILY
Qty: 90 CAPSULE | Refills: 1 | Status: SHIPPED | OUTPATIENT
Start: 2023-11-13 | End: 2023-11-13 | Stop reason: SDUPTHER

## 2023-11-13 RX ORDER — PANTOPRAZOLE SODIUM 40 MG/1
40 TABLET, DELAYED RELEASE ORAL DAILY
Qty: 90 TABLET | Refills: 1 | Status: SHIPPED | OUTPATIENT
Start: 2023-11-13

## 2023-11-13 RX ORDER — PANTOPRAZOLE SODIUM 40 MG/1
40 TABLET, DELAYED RELEASE ORAL DAILY
Qty: 90 TABLET | Refills: 1 | Status: SHIPPED | OUTPATIENT
Start: 2023-11-13 | End: 2023-11-13 | Stop reason: SDUPTHER

## 2023-11-13 SDOH — ECONOMIC STABILITY: TRANSPORTATION INSECURITY
IN THE PAST 12 MONTHS, HAS THE LACK OF TRANSPORTATION KEPT YOU FROM MEDICAL APPOINTMENTS OR FROM GETTING MEDICATIONS?: NO

## 2023-11-13 SDOH — ECONOMIC STABILITY: HOUSING INSECURITY
IN THE LAST 12 MONTHS, WAS THERE A TIME WHEN YOU DID NOT HAVE A STEADY PLACE TO SLEEP OR SLEPT IN A SHELTER (INCLUDING NOW)?: NO

## 2023-11-13 SDOH — ECONOMIC STABILITY: TRANSPORTATION INSECURITY
IN THE PAST 12 MONTHS, HAS LACK OF TRANSPORTATION KEPT YOU FROM MEETINGS, WORK, OR FROM GETTING THINGS NEEDED FOR DAILY LIVING?: NO

## 2023-11-13 SDOH — HEALTH STABILITY: PHYSICAL HEALTH: ON AVERAGE, HOW MANY DAYS PER WEEK DO YOU ENGAGE IN MODERATE TO STRENUOUS EXERCISE (LIKE A BRISK WALK)?: 2 DAYS

## 2023-11-13 SDOH — ECONOMIC STABILITY: FOOD INSECURITY: WITHIN THE PAST 12 MONTHS, THE FOOD YOU BOUGHT JUST DIDN'T LAST AND YOU DIDN'T HAVE MONEY TO GET MORE.: NEVER TRUE

## 2023-11-13 SDOH — ECONOMIC STABILITY: INCOME INSECURITY: HOW HARD IS IT FOR YOU TO PAY FOR THE VERY BASICS LIKE FOOD, HOUSING, MEDICAL CARE, AND HEATING?: NOT VERY HARD

## 2023-11-13 SDOH — ECONOMIC STABILITY: FOOD INSECURITY: WITHIN THE PAST 12 MONTHS, YOU WORRIED THAT YOUR FOOD WOULD RUN OUT BEFORE YOU GOT MONEY TO BUY MORE.: NEVER TRUE

## 2023-11-13 SDOH — ECONOMIC STABILITY: INCOME INSECURITY: IN THE LAST 12 MONTHS, WAS THERE A TIME WHEN YOU WERE NOT ABLE TO PAY THE MORTGAGE OR RENT ON TIME?: NO

## 2023-11-13 NOTE — PROGRESS NOTES
Chief Complaint   Patient presents with    New Patient    Establish Care    Gastroesophageal Reflux     Pt brought a list of the meds, went over list in chart, pt confirmed      No other c/o    1. Have you been to the ER, urgent care clinic since your last visit? Hospitalized since your last visit? No    2. Have you seen or consulted any other health care providers outside of the [address] [place] [address] since your last visit? Include any pap smears or colon screening.  No
Neurological:      Mental Status: She is alert and oriented to person, place, and time. Mental status is at baseline. Psychiatric:         Mood and Affect: Mood normal.         Behavior: Behavior normal.          Assessment & Plan  1. Gastroesophageal reflux disease without esophagitis  Continue protonix 40mg daily   Avoid all NSAIDS  Avoid foods that worsen symptoms, elevate HOB prn, eat smaller, more freuquent meals, avoid laying flat 1 hour after meal  -     pantoprazole (PROTONIX) 40 MG tablet; Take 1 tablet by mouth daily, Disp-90 tablet, R-1Normal    2. Acquired hypothyroidism  TSH 8.24 10/24/23 on record review  Changed levothyroxine 100mcq daily by prior pcp  Repeat TSH in 3 mo  -     levothyroxine (SYNTHROID) 100 MCG tablet; Take 1 tablet by mouth Daily, Disp-90 tablet, R-1Normal    3. Hypercholesterolemia   2/2022 on record review    4. Psychophysiological insomnia  Melatonin and trazadone ineffective  Trial with hydroxyzine 25mg nightly prn . Reviewed use and potential side effects. 5. Anxiety and Depression  Buspar , sertraline and effexor not effective on previous trials per record  Continues cymbalta 60mg daily which does help with symptoms  Start on hydroxyzine 25mg nightly prn. Reviewed use and potential side effects. She does have psychiatrist in [place], [place] and will schedule follow up soon  -     hydrOXYzine HCl (ATARAX) 25 MG tablet; Take 1 tablet by mouth nightly as needed for Anxiety, Disp-30 tablet, R-2Normal  -     DULoxetine (CYMBALTA) 60 MG extended release capsule; Take 1 capsule by mouth daily, Disp-90 capsule, R-1Normal    6. Tobacco dependence  Have counseled on dangers of continued smoking and cessation advised    7.  Recurrent syncope  Reports sx > 1 year and has adapted practices to help avoid syncope  She has been evaluated by cardiology with no clear cardiac cause  Not clear on cause  Has appointment with new neurologist Dr. Ar Donaldson and will try to obtain records

## 2023-11-30 RX ORDER — HYDROXYZINE HYDROCHLORIDE 25 MG/1
25 TABLET, FILM COATED ORAL NIGHTLY PRN
Qty: 30 TABLET | Refills: 2 | Status: SHIPPED
Start: 2023-11-30 | End: 2023-12-30

## 2023-12-05 ASSESSMENT — ENCOUNTER SYMPTOMS
NAUSEA: 0
EYE PAIN: 0
EYE ITCHING: 0
BACK PAIN: 1
DIARRHEA: 0
BLOOD IN STOOL: 0
EYE DISCHARGE: 0
SHORTNESS OF BREATH: 0
CONSTIPATION: 0
WHEEZING: 0
SORE THROAT: 0
ABDOMINAL PAIN: 0
FACIAL SWELLING: 0
CHEST TIGHTNESS: 0
VOMITING: 0
CHOKING: 0
STRIDOR: 0
PHOTOPHOBIA: 0
TROUBLE SWALLOWING: 0
SINUS PAIN: 0
COLOR CHANGE: 0
EYE REDNESS: 0
COUGH: 0
ABDOMINAL DISTENTION: 0
APNEA: 0

## 2023-12-12 ENCOUNTER — OFFICE VISIT (OUTPATIENT)
Facility: CLINIC | Age: 48
End: 2023-12-12
Payer: MEDICAID

## 2023-12-12 VITALS
HEART RATE: 76 BPM | BODY MASS INDEX: 23.14 KG/M2 | RESPIRATION RATE: 20 BRPM | DIASTOLIC BLOOD PRESSURE: 60 MMHG | TEMPERATURE: 97.2 F | HEIGHT: 59 IN | SYSTOLIC BLOOD PRESSURE: 112 MMHG | WEIGHT: 114.8 LBS | OXYGEN SATURATION: 97 %

## 2023-12-12 DIAGNOSIS — F17.200 TOBACCO DEPENDENCE: ICD-10-CM

## 2023-12-12 DIAGNOSIS — R29.898 RIGHT LEG WEAKNESS: ICD-10-CM

## 2023-12-12 DIAGNOSIS — M54.16 LUMBAR RADICULOPATHY: Primary | ICD-10-CM

## 2023-12-12 PROCEDURE — 99214 OFFICE O/P EST MOD 30 MIN: CPT | Performed by: NURSE PRACTITIONER

## 2023-12-12 RX ORDER — BUPROPION HYDROCHLORIDE 150 MG/1
150 TABLET ORAL EVERY MORNING
Qty: 30 TABLET | Refills: 3 | Status: SHIPPED | OUTPATIENT
Start: 2023-12-12

## 2023-12-12 RX ORDER — ATORVASTATIN CALCIUM 40 MG/1
40 TABLET, FILM COATED ORAL DAILY
COMMUNITY
Start: 2023-11-27

## 2023-12-12 RX ORDER — ASPIRIN 81 MG/1
81 TABLET, CHEWABLE ORAL DAILY
COMMUNITY

## 2023-12-12 NOTE — PROGRESS NOTES
Chief Complaint   Patient presents with    Fall     Pt stated that her right leg gives out without any warning and she falls     Been over a month, but more frequent the last few weeks     1. Have you been to the ER, urgent care clinic since your last visit? Hospitalized since your last visit? No    2. Have you seen or consulted any other health care providers outside of the [address] [place] [address] since your last visit? Include any pap smears or colon screening.  No

## 2023-12-12 NOTE — PROGRESS NOTES
Subjective  Chief Complaint   Patient presents with    Fall     Pt stated that her right leg gives out without any warning and she falls     HPI:  Princess Schaefer is a 57 y.o. female with PMH of GERD, hyperlipidemia, IBS, depression, anxiety, hypothyroidism, migraines, obesity, chronic back pain, insomnia and recurrent syncope who reports concerns with right leg giving out. States that she has had ongoing issues with this but worse in past several weeks. Had fall 1 week ago which led to her hitting head on counter. Reports she was going to walk when leg gave out. Denies having any pain or buckling of right knee. No hip pain with walking but does feel sore to touch. No constant numbness or bowel/bladder changes. Would also like to try medication to help with smoking cessation. Has been cutting back on her own but finding it difficult.        Past Medical History:   Diagnosis Date    Adrenal insufficiency ([address]) 1/18/2023    Chest pain     Generalized anxiety disorder     GERD (gastroesophageal reflux disease)     Hypercholesterolemia     Hypothyroidism     IBS (irritable bowel syndrome)     Moderate recurrent major depression (HCC)     Osteoarthritis of right knee     SOB (shortness of breath) on exertion     Tendinitis of right rotator cuff         Past Surgical History:   Procedure Laterality Date    APPENDECTOMY      CARDIAC PROCEDURE N/A 11/6/2023    Left heart cath / coronary angiography performed by Lars Kerns MD at [facility] CATH LAB    COLONOSCOPY      HYSTERECTOMY ([facility])  2019    ovaries remain    INVASIVE VASCULAR N/A 11/6/2023    Ultrasound guided vascular access performed by Lars Kerns MD at [facility] CATH LAB    KNEE ARTHROSCOPY Right     OVARY REMOVAL Bilateral         Family History   Problem Relation Age of Onset    Breast Cancer Mother     Stroke Father     Heart Disease Father     Heart Attack Father     Pancreatic Cancer Father         Social History     Socioeconomic

## 2023-12-13 ASSESSMENT — ENCOUNTER SYMPTOMS
CHEST TIGHTNESS: 0
SINUS PAIN: 0
VOMITING: 0
BLOOD IN STOOL: 0
FACIAL SWELLING: 0
DIARRHEA: 0
TROUBLE SWALLOWING: 0
EYE REDNESS: 0
COLOR CHANGE: 0
APNEA: 0
SORE THROAT: 0
EYE ITCHING: 0
ABDOMINAL DISTENTION: 0
WHEEZING: 0
EYE DISCHARGE: 0
EYE PAIN: 0
SHORTNESS OF BREATH: 0
CHOKING: 0
STRIDOR: 0
BACK PAIN: 1
NAUSEA: 0
CONSTIPATION: 0
ABDOMINAL PAIN: 0
COUGH: 0
PHOTOPHOBIA: 0

## 2023-12-15 DIAGNOSIS — M75.81 TENDINITIS OF RIGHT ROTATOR CUFF: Primary | ICD-10-CM

## 2023-12-19 ENCOUNTER — PREP FOR PROCEDURE (OUTPATIENT)
Age: 48
End: 2023-12-19

## 2024-01-03 ENCOUNTER — PATIENT MESSAGE (OUTPATIENT)
Facility: CLINIC | Age: 49
End: 2024-01-03

## 2024-01-03 DIAGNOSIS — T78.40XA ALLERGY, INITIAL ENCOUNTER: Primary | ICD-10-CM

## 2024-01-04 RX ORDER — CETIRIZINE HYDROCHLORIDE 10 MG/1
10 TABLET ORAL DAILY
Qty: 30 TABLET | Refills: 2 | Status: SHIPPED | OUTPATIENT
Start: 2024-01-04 | End: 2024-02-03

## 2024-01-19 NOTE — PROGRESS NOTES
that they would be in a sling for 4-6 weeks postoperatively with limited range of motion depending on need for rotator cuff repair and after 4-6 weeks they would begin gentle range of motion, they would begin gradual strengthening at 3 months postop and would require physical therapy and rehabilitation for 4-6 months postop.  For postoperative pain the patient will be given oxycodone, NSAIDs, tylenol, stool softener, Zofran.     The risks, benefits and alternatives of the procedure were discussed with the patient including the risk of infection, bleeding, damage to surrounding nerves, tendons, blood vessels, need for further procedures, stiffness, chronic pain, wound healing issues, biceps deformity and cramping, inadequate healing of rotator cuff repair or retear.   The patient will be scheduled for surgery all questions were answered.      She will be scheduled for surgery on Thursday Feb 15, 2024.  This will count as the preop H&P.

## 2024-01-22 ENCOUNTER — OFFICE VISIT (OUTPATIENT)
Age: 49
End: 2024-01-22
Payer: MEDICAID

## 2024-01-22 VITALS — BODY MASS INDEX: 22.98 KG/M2 | WEIGHT: 114 LBS | HEIGHT: 59 IN

## 2024-01-22 DIAGNOSIS — M19.011 ARTHRITIS OF RIGHT ACROMIOCLAVICULAR JOINT: ICD-10-CM

## 2024-01-22 DIAGNOSIS — M75.21 BICEPS TENDINITIS OF RIGHT SHOULDER: Primary | ICD-10-CM

## 2024-01-22 DIAGNOSIS — M75.111 NONTRAUMATIC INCOMPLETE TEAR OF RIGHT ROTATOR CUFF: ICD-10-CM

## 2024-01-22 DIAGNOSIS — M75.81 TENDINITIS OF RIGHT ROTATOR CUFF: ICD-10-CM

## 2024-01-22 PROCEDURE — 99213 OFFICE O/P EST LOW 20 MIN: CPT | Performed by: ORTHOPAEDIC SURGERY

## 2024-01-22 ASSESSMENT — PATIENT HEALTH QUESTIONNAIRE - PHQ9
SUM OF ALL RESPONSES TO PHQ QUESTIONS 1-9: 0
SUM OF ALL RESPONSES TO PHQ QUESTIONS 1-9: 0
1. LITTLE INTEREST OR PLEASURE IN DOING THINGS: 0
SUM OF ALL RESPONSES TO PHQ9 QUESTIONS 1 & 2: 0
2. FEELING DOWN, DEPRESSED OR HOPELESS: 0
SUM OF ALL RESPONSES TO PHQ QUESTIONS 1-9: 0
SUM OF ALL RESPONSES TO PHQ QUESTIONS 1-9: 0

## 2024-02-05 ENCOUNTER — OFFICE VISIT (OUTPATIENT)
Facility: CLINIC | Age: 49
End: 2024-02-05
Payer: MEDICAID

## 2024-02-05 VITALS
HEIGHT: 59 IN | WEIGHT: 116.8 LBS | RESPIRATION RATE: 18 BRPM | BODY MASS INDEX: 23.55 KG/M2 | OXYGEN SATURATION: 98 % | SYSTOLIC BLOOD PRESSURE: 113 MMHG | DIASTOLIC BLOOD PRESSURE: 63 MMHG | TEMPERATURE: 97.4 F | HEART RATE: 100 BPM

## 2024-02-05 DIAGNOSIS — M25.551 RIGHT HIP PAIN: Primary | ICD-10-CM

## 2024-02-05 PROCEDURE — 99213 OFFICE O/P EST LOW 20 MIN: CPT | Performed by: NURSE PRACTITIONER

## 2024-02-05 RX ORDER — PREDNISONE 20 MG/1
TABLET ORAL
Qty: 9 TABLET | Refills: 0 | Status: SHIPPED | OUTPATIENT
Start: 2024-02-05 | End: 2024-02-10

## 2024-02-05 ASSESSMENT — ENCOUNTER SYMPTOMS
PHOTOPHOBIA: 0
EYE REDNESS: 0
TROUBLE SWALLOWING: 0
STRIDOR: 0
SORE THROAT: 0
ABDOMINAL PAIN: 0
SINUS PAIN: 0
NAUSEA: 0
CONSTIPATION: 0
BACK PAIN: 1
WHEEZING: 0
BLOOD IN STOOL: 0
CHEST TIGHTNESS: 0
DIARRHEA: 0
EYE ITCHING: 0
EYE DISCHARGE: 0
SHORTNESS OF BREATH: 0
EYE PAIN: 0
FACIAL SWELLING: 0
ABDOMINAL DISTENTION: 0
VOMITING: 0
APNEA: 0
COLOR CHANGE: 0
CHOKING: 0
COUGH: 0

## 2024-02-05 NOTE — PROGRESS NOTES
Subjective  Chief Complaint   Patient presents with    Hip Injury     Pt stated she noticed that her right hip swollen, she said it look like a ball on the side of her hip, today is better      HPI:  Echo Smith is a 48 y.o. female with PMH  of GERD, hyperlipidemia, IBS, depression, anxiety, hypothyroidism, migraines, obesity, right shoulder pain, chronic back pain, insomnia and recurrent syncope who presents with complaint of right hip pain and swelling. States that she has had ongoing pain in right hip over time but never noted swelling in the past. Woke up 1 day ago and right hip appeared swollen to outer aspect. Is seeing improvement today but concerned on reason for swelling. Denies any recent or prior injury. Does experience daily pain to right hip with walking. Intermittent tingling reported but no weakness or bowel/bladder changes. Saw Dr. Churchill in 12/2023 regarding right shoulder and hip pain. Had xrays done which were ok but has not had any improvement with hip pain.      Past Medical History:   Diagnosis Date    Adrenal insufficiency (HCC) 1/18/2023    Chest pain     Generalized anxiety disorder     GERD (gastroesophageal reflux disease)     Hypercholesterolemia     Hypothyroidism     IBS (irritable bowel syndrome)     Moderate recurrent major depression (HCC)     Osteoarthritis of right knee     SOB (shortness of breath) on exertion     Tendinitis of right rotator cuff         Past Surgical History:   Procedure Laterality Date    APPENDECTOMY      CARDIAC PROCEDURE N/A 11/6/2023    Left heart cath / coronary angiography performed by Malik Reddy MD at [facility] CARDIAC CATH LAB    COLONOSCOPY      HYSTERECTOMY (CERVIX STATUS UNKNOWN)  2019    ovaries remain    INVASIVE VASCULAR N/A 11/6/2023    Ultrasound guided vascular access performed by Malik Reddy MD at [facility] CARDIAC CATH LAB    KNEE ARTHROSCOPY Right     OVARY REMOVAL Bilateral         Family History   Problem Relation Age of Onset

## 2024-02-05 NOTE — PROGRESS NOTES
Chief Complaint   Patient presents with    Hip Injury     Pt stated she noticed that her right hip swollen, she said it look like a ball on the side of her hip, today is better      Noticed it yesterday.    1. Have you been to the ER, urgent care clinic since your last visit?  Hospitalized since your last visit?No    2. Have you seen or consulted any other health care providers outside of the [facility] since your last visit?  Include any pap smears or colon screening. No

## 2024-02-12 ENCOUNTER — OFFICE VISIT (OUTPATIENT)
Age: 49
End: 2024-02-12
Payer: MEDICAID

## 2024-02-12 VITALS
WEIGHT: 116 LBS | DIASTOLIC BLOOD PRESSURE: 56 MMHG | RESPIRATION RATE: 18 BRPM | SYSTOLIC BLOOD PRESSURE: 113 MMHG | TEMPERATURE: 97.1 F | OXYGEN SATURATION: 99 % | HEIGHT: 59 IN | BODY MASS INDEX: 23.39 KG/M2 | HEART RATE: 81 BPM

## 2024-02-12 DIAGNOSIS — G43.711 CHRONIC MIGRAINE WITHOUT AURA, INTRACTABLE, WITH STATUS MIGRAINOSUS: Primary | ICD-10-CM

## 2024-02-12 DIAGNOSIS — H53.9 VISUAL CHANGES: ICD-10-CM

## 2024-02-12 PROCEDURE — 99205 OFFICE O/P NEW HI 60 MIN: CPT | Performed by: NURSE PRACTITIONER

## 2024-02-12 RX ORDER — SUMATRIPTAN SUCCINATE 6 MG/.5ML
1 INJECTION, SOLUTION SUBCUTANEOUS AS NEEDED
Qty: 3 ML | Refills: 3 | Status: SHIPPED | OUTPATIENT
Start: 2024-02-12

## 2024-02-12 RX ORDER — FREMANEZUMAB-VFRM 225 MG/1.5ML
1.5 INJECTION SUBCUTANEOUS
Qty: 1.5 ML | Refills: 5 | Status: SHIPPED | OUTPATIENT
Start: 2024-02-12

## 2024-02-12 RX ORDER — UBROGEPANT 100 MG/1
TABLET ORAL
Qty: 10 TABLET | Refills: 4 | Status: SHIPPED | OUTPATIENT
Start: 2024-02-12

## 2024-02-12 RX ORDER — TOPIRAMATE 50 MG/1
TABLET, FILM COATED ORAL
Qty: 90 TABLET | Refills: 1 | Status: SHIPPED | OUTPATIENT
Start: 2024-02-12

## 2024-02-12 NOTE — PROGRESS NOTES
Echo Smith is a 48 y.o. female who presents with the following  Chief Complaint   Patient presents with    New Patient     Migraines x 1 year         HPI    New patient for migraine    She has had migraines for years   She has noticed the last 2 years they have gotten significantly more intense, debilitating.   She has about 3-4 a week   Lasting anywhere from hours to longer.   She has been on Ajovy X 6 months, possibly some improvement.   Failed Wellbutrin, Elavil.   Can not take BP medications due heart   Currently being worked up     She is on Ajovy every 30 days.   Uses Imitrex PRN   Injectable, causes pain   Does help but not fully abort.     She has never used Topamax.   Never used Ubrelvy   With her heart, we can try to move away from triptans.     Migraines are debilitating.   Sharp stabbing pain.   Smuched in vice  - both sides of head.   Nausea, dizziness.  Hypersensitivity.   She has not seen much of a difference here recently.   Having a lot of should problems.   No other concerns at the moment, no recent MRI   Will get notes from neurocare.       No Known Allergies    Current Outpatient Medications   Medication Sig Dispense Refill    topiramate (TOPAMAX) 50 MG tablet Take 1 tablet by nightly 90 tablet 1    Ubrogepant (UBRELVY) 100 MG TABS 1 at HA onset and repeat in 2 hours if needed.  Max 2 in 24 hours 10 tablet 4    atorvastatin (LIPITOR) 40 MG tablet Take 1 tablet by mouth daily      aspirin 81 MG chewable tablet Take 1 tablet by mouth daily      pantoprazole (PROTONIX) 40 MG tablet Take 1 tablet by mouth daily 90 tablet 1    SUMAtriptan Succinate Refill 6 MG/0.5ML SOCT Inject 1 Syringe into the skin as needed      AJOVY 225 MG/1.5ML SOSY Inject 1.5 mLs into the skin every 30 days      vitamin D (CHOLECALCIFEROL) 25 MCG (1000 UT) TABS tablet Take 1 tablet by mouth daily       No current facility-administered medications for this visit.        Social History     Tobacco Use   Smoking

## 2024-02-13 ENCOUNTER — TELEPHONE (OUTPATIENT)
Age: 49
End: 2024-02-13

## 2024-02-13 NOTE — TELEPHONE ENCOUNTER
RE: Ubrelvy Denied due to tried/failed triptan use    Key: [id number]    This is a NP, and I sent the only note that has been done thus far.     Denial has been scanned into chart.    Fyi to nurse.

## 2024-02-15 ENCOUNTER — TRANSCRIBE ORDERS (OUTPATIENT)
Facility: HOSPITAL | Age: 49
End: 2024-02-15

## 2024-02-15 DIAGNOSIS — I77.1 STRICTURE OF ARTERY (HCC): Primary | ICD-10-CM

## 2024-02-15 DIAGNOSIS — I70.218 ATHSCL NATIVE ARTERIES OF EXTRM W INTRMT CLAUD, OTH EXTRM (HCC): ICD-10-CM

## 2024-02-27 ENCOUNTER — HOSPITAL ENCOUNTER (OUTPATIENT)
Facility: HOSPITAL | Age: 49
Discharge: HOME | End: 2024-03-01
Payer: MEDICAID

## 2024-02-27 ENCOUNTER — APPOINTMENT (OUTPATIENT)
Facility: HOSPITAL | Age: 49
End: 2024-02-27
Payer: MEDICAID

## 2024-02-27 DIAGNOSIS — H53.9 VISUAL CHANGES: ICD-10-CM

## 2024-02-27 DIAGNOSIS — G43.711 CHRONIC MIGRAINE WITHOUT AURA, INTRACTABLE, WITH STATUS MIGRAINOSUS: ICD-10-CM

## 2024-02-27 PROCEDURE — A9577 INJ MULTIHANCE: HCPCS | Performed by: NURSE PRACTITIONER

## 2024-02-27 PROCEDURE — 70553 MRI BRAIN STEM W/O & W/DYE: CPT

## 2024-02-27 PROCEDURE — 6360000004 HC RX CONTRAST MEDICATION: Performed by: NURSE PRACTITIONER

## 2024-02-27 RX ADMIN — GADOBENATE DIMEGLUMINE 10 ML: 529 INJECTION, SOLUTION INTRAVENOUS at 14:01

## 2024-03-27 ENCOUNTER — HOSPITAL ENCOUNTER (OUTPATIENT)
Facility: HOSPITAL | Age: 49
Discharge: HOME | End: 2024-03-30
Payer: MEDICAID

## 2024-03-27 DIAGNOSIS — I70.218 ATHSCL NATIVE ARTERIES OF EXTRM W INTRMT CLAUD, OTH EXTRM (HCC): ICD-10-CM

## 2024-03-27 DIAGNOSIS — I77.1 STRICTURE OF ARTERY (HCC): ICD-10-CM

## 2024-03-27 LAB — CREAT BLD-MCNC: 0.7 MG/DL (ref 0.6–1.3)

## 2024-03-27 PROCEDURE — 70498 CT ANGIOGRAPHY NECK: CPT

## 2024-03-27 PROCEDURE — 6360000004 HC RX CONTRAST MEDICATION: Performed by: SURGERY

## 2024-03-27 PROCEDURE — 71275 CT ANGIOGRAPHY CHEST: CPT

## 2024-03-27 PROCEDURE — 82565 ASSAY OF CREATININE: CPT

## 2024-03-27 RX ADMIN — IOPAMIDOL 100 ML: 755 INJECTION, SOLUTION INTRAVENOUS at 10:26

## 2024-04-05 DIAGNOSIS — T78.40XA ALLERGY, INITIAL ENCOUNTER: ICD-10-CM

## 2024-04-05 RX ORDER — CETIRIZINE HYDROCHLORIDE 10 MG/1
10 TABLET ORAL DAILY
Qty: 90 TABLET | Refills: 1 | Status: SHIPPED | OUTPATIENT
Start: 2024-04-05

## 2024-04-10 ENCOUNTER — HOSPITAL ENCOUNTER (OUTPATIENT)
Facility: HOSPITAL | Age: 49
Discharge: HOME | End: 2024-04-13
Payer: MEDICAID

## 2024-04-10 DIAGNOSIS — I70.218 ATHSCL NATIVE ARTERIES OF EXTRM W INTRMT CLAUD, OTH EXTRM (HCC): ICD-10-CM

## 2024-04-10 DIAGNOSIS — I77.1 STRICTURE OF ARTERY (HCC): ICD-10-CM

## 2024-04-10 PROCEDURE — 71275 CT ANGIOGRAPHY CHEST: CPT

## 2024-04-10 PROCEDURE — 70498 CT ANGIOGRAPHY NECK: CPT

## 2024-04-10 PROCEDURE — 6360000004 HC RX CONTRAST MEDICATION: Performed by: SURGERY

## 2024-04-10 RX ADMIN — IOPAMIDOL 100 ML: 755 INJECTION, SOLUTION INTRAVENOUS at 09:23

## 2024-04-24 ENCOUNTER — HOSPITAL ENCOUNTER (OUTPATIENT)
Facility: HOSPITAL | Age: 49
Discharge: HOME | End: 2024-04-27
Payer: MEDICAID

## 2024-04-24 ENCOUNTER — HOSPITAL ENCOUNTER (OUTPATIENT)
Facility: HOSPITAL | Age: 49
End: 2024-04-24
Payer: MEDICAID

## 2024-04-24 VITALS
HEIGHT: 60 IN | TEMPERATURE: 98.1 F | SYSTOLIC BLOOD PRESSURE: 111 MMHG | HEART RATE: 78 BPM | WEIGHT: 120.15 LBS | RESPIRATION RATE: 18 BRPM | DIASTOLIC BLOOD PRESSURE: 63 MMHG | BODY MASS INDEX: 23.59 KG/M2

## 2024-04-24 LAB
ABO + RH BLD: NORMAL
ANION GAP SERPL CALC-SCNC: 5 MMOL/L (ref 5–15)
APPEARANCE UR: ABNORMAL
APTT PPP: 23.9 SEC (ref 22.1–31)
BACTERIA URNS QL MICRO: ABNORMAL /HPF
BILIRUB UR QL: NEGATIVE
BLOOD GROUP ANTIBODIES SERPL: NORMAL
BUN SERPL-MCNC: 7 MG/DL (ref 6–20)
BUN/CREAT SERPL: 10 (ref 12–20)
CALCIUM SERPL-MCNC: 9.5 MG/DL (ref 8.5–10.1)
CHLORIDE SERPL-SCNC: 109 MMOL/L (ref 97–108)
CO2 SERPL-SCNC: 26 MMOL/L (ref 21–32)
COLOR UR: ABNORMAL
CREAT SERPL-MCNC: 0.72 MG/DL (ref 0.55–1.02)
EKG ATRIAL RATE: 72 BPM
EKG DIAGNOSIS: NORMAL
EKG P AXIS: 69 DEGREES
EKG P-R INTERVAL: 160 MS
EKG Q-T INTERVAL: 398 MS
EKG QRS DURATION: 74 MS
EKG QTC CALCULATION (BAZETT): 435 MS
EKG R AXIS: 67 DEGREES
EKG T AXIS: 40 DEGREES
EKG VENTRICULAR RATE: 72 BPM
EPITH CASTS URNS QL MICRO: ABNORMAL /LPF
ERYTHROCYTE [DISTWIDTH] IN BLOOD BY AUTOMATED COUNT: 11.9 % (ref 11.5–14.5)
GLUCOSE SERPL-MCNC: 92 MG/DL (ref 65–100)
GLUCOSE UR STRIP.AUTO-MCNC: NEGATIVE MG/DL
HCT VFR BLD AUTO: 40.2 % (ref 35–47)
HGB BLD-MCNC: 13.6 G/DL (ref 11.5–16)
HGB UR QL STRIP: NEGATIVE
HYALINE CASTS URNS QL MICRO: ABNORMAL /LPF (ref 0–2)
INR PPP: 1 (ref 0.9–1.1)
KETONES UR QL STRIP.AUTO: NEGATIVE MG/DL
LEUKOCYTE ESTERASE UR QL STRIP.AUTO: NEGATIVE
MCH RBC QN AUTO: 32.2 PG (ref 26–34)
MCHC RBC AUTO-ENTMCNC: 33.8 G/DL (ref 30–36.5)
MCV RBC AUTO: 95.3 FL (ref 80–99)
NITRITE UR QL STRIP.AUTO: NEGATIVE
NRBC # BLD: 0 K/UL (ref 0–0.01)
NRBC BLD-RTO: 0 PER 100 WBC
PH UR STRIP: 8 (ref 5–8)
PLATELET # BLD AUTO: 282 K/UL (ref 150–400)
PMV BLD AUTO: 11.1 FL (ref 8.9–12.9)
POTASSIUM SERPL-SCNC: 4 MMOL/L (ref 3.5–5.1)
PROT UR STRIP-MCNC: NEGATIVE MG/DL
PROTHROMBIN TIME: 10.4 SEC (ref 9–11.1)
RBC # BLD AUTO: 4.22 M/UL (ref 3.8–5.2)
RBC #/AREA URNS HPF: ABNORMAL /HPF (ref 0–5)
SODIUM SERPL-SCNC: 140 MMOL/L (ref 136–145)
SP GR UR REFRACTOMETRY: 1.01
SPECIMEN EXP DATE BLD: NORMAL
THERAPEUTIC RANGE: NORMAL SECS (ref 58–77)
URINE CULTURE IF INDICATED: ABNORMAL
UROBILINOGEN UR QL STRIP.AUTO: 0.2 EU/DL (ref 0.2–1)
WBC # BLD AUTO: 6.8 K/UL (ref 3.6–11)
WBC URNS QL MICRO: ABNORMAL /HPF (ref 0–4)

## 2024-04-24 PROCEDURE — 36415 COLL VENOUS BLD VENIPUNCTURE: CPT

## 2024-04-24 PROCEDURE — 85730 THROMBOPLASTIN TIME PARTIAL: CPT

## 2024-04-24 PROCEDURE — 86901 BLOOD TYPING SEROLOGIC RH(D): CPT

## 2024-04-24 PROCEDURE — 85610 PROTHROMBIN TIME: CPT

## 2024-04-24 PROCEDURE — 86900 BLOOD TYPING SEROLOGIC ABO: CPT

## 2024-04-24 PROCEDURE — 86850 RBC ANTIBODY SCREEN: CPT

## 2024-04-24 PROCEDURE — 85027 COMPLETE CBC AUTOMATED: CPT

## 2024-04-24 PROCEDURE — 80048 BASIC METABOLIC PNL TOTAL CA: CPT

## 2024-04-24 PROCEDURE — 81001 URINALYSIS AUTO W/SCOPE: CPT

## 2024-04-24 RX ORDER — SODIUM CHLORIDE, SODIUM LACTATE, POTASSIUM CHLORIDE, CALCIUM CHLORIDE 600; 310; 30; 20 MG/100ML; MG/100ML; MG/100ML; MG/100ML
INJECTION, SOLUTION INTRAVENOUS ONCE
OUTPATIENT
Start: 2024-05-02

## 2024-04-24 ASSESSMENT — PAIN DESCRIPTION - ORIENTATION: ORIENTATION: RIGHT

## 2024-04-24 ASSESSMENT — PAIN DESCRIPTION - FREQUENCY: FREQUENCY: CONTINUOUS

## 2024-04-24 ASSESSMENT — PAIN DESCRIPTION - LOCATION: LOCATION: ARM;BACK

## 2024-04-24 ASSESSMENT — PAIN SCALES - GENERAL: PAINLEVEL_OUTOF10: 8

## 2024-04-24 NOTE — PROGRESS NOTES
[facility]  Preoperative Instructions        Surgery Date 05/02/24          Time of Arrival to be called on 05/01/24 after 2 pm  Contact: [phone]     1. On the day of your surgery, please report to the Surgical Services Registration Desk and sign in at your designated time. The Surgery Center is located to the right of the Emergency Room.     2. You must have someone with you to drive you home. You should not drive a car for 24 hours following surgery. Please make arrangements for a friend or family member to stay with you for the first 24 hours after your surgery.    3. Do not have anything to eat or drink (including water, gum, mints, coffee, juice) after midnight ?05/01/24?      .?This may not apply to medications prescribed by your physician. ?(Please note below the special instructions with medications to take the morning of your procedure.)    4. We recommend you do not drink any alcoholic beverages for 24 hours before and after your surgery.    5. Contact your surgeon’s office for instructions on the following medications: non-steroidal anti-inflammatory drugs (i.e. Advil, Aleve), vitamins, and supplements. (Some surgeon’s will want you to stop these medications prior to surgery and others may allow you to take them)  **If you are currently taking Plavix, Coumadin, Aspirin and/or other blood-thinning agents, contact your surgeon for instructions.** Your surgeon will partner with the physician prescribing these medications to determine if it is safe to stop or if you need to continue taking.  Please do not stop taking these medications without instructions from your surgeon    6. Wear comfortable clothes.  Wear glasses instead of contacts.  Do not bring any money or jewelry. Please bring picture ID, insurance card, and any prearranged co-payment or hospital payment.  Do not wear make-up, particularly mascara the morning of your surgery.  Do not wear nail polish, particularly if

## 2024-04-24 NOTE — PROGRESS NOTES

## 2024-04-26 NOTE — PERIOP NOTE
Called Dr Khan's office to check on H & P status. Cedrick said it will be completed by Monday and will fax over to us.

## 2024-05-02 ENCOUNTER — HOSPITAL ENCOUNTER (OUTPATIENT)
Facility: HOSPITAL | Age: 49
Setting detail: OUTPATIENT SURGERY
Discharge: HOME | End: 2024-05-02
Attending: SURGERY | Admitting: SURGERY
Payer: MEDICAID

## 2024-05-02 ENCOUNTER — ANESTHESIA EVENT (OUTPATIENT)
Facility: HOSPITAL | Age: 49
End: 2024-05-02
Payer: MEDICAID

## 2024-05-02 ENCOUNTER — APPOINTMENT (OUTPATIENT)
Facility: HOSPITAL | Age: 49
End: 2024-05-02
Attending: SURGERY
Payer: MEDICAID

## 2024-05-02 ENCOUNTER — ANESTHESIA (OUTPATIENT)
Facility: HOSPITAL | Age: 49
End: 2024-05-02
Payer: MEDICAID

## 2024-05-02 VITALS
RESPIRATION RATE: 14 BRPM | OXYGEN SATURATION: 97 % | TEMPERATURE: 97.7 F | HEIGHT: 60 IN | SYSTOLIC BLOOD PRESSURE: 118 MMHG | BODY MASS INDEX: 23.33 KG/M2 | WEIGHT: 118.83 LBS | HEART RATE: 68 BPM | DIASTOLIC BLOOD PRESSURE: 69 MMHG

## 2024-05-02 DIAGNOSIS — I73.9 PAD (PERIPHERAL ARTERY DISEASE) (HCC): Primary | ICD-10-CM

## 2024-05-02 PROCEDURE — 6360000002 HC RX W HCPCS: Performed by: SURGERY

## 2024-05-02 PROCEDURE — 6370000000 HC RX 637 (ALT 250 FOR IP): Performed by: ANESTHESIOLOGY

## 2024-05-02 PROCEDURE — 2580000003 HC RX 258: Performed by: ANESTHESIOLOGY

## 2024-05-02 PROCEDURE — A4217 STERILE WATER/SALINE, 500 ML: HCPCS | Performed by: SURGERY

## 2024-05-02 PROCEDURE — 2709999900 HC NON-CHARGEABLE SUPPLY: Performed by: SURGERY

## 2024-05-02 PROCEDURE — C1769 GUIDE WIRE: HCPCS | Performed by: SURGERY

## 2024-05-02 PROCEDURE — 6360000002 HC RX W HCPCS

## 2024-05-02 PROCEDURE — 7100000001 HC PACU RECOVERY - ADDTL 15 MIN: Performed by: SURGERY

## 2024-05-02 PROCEDURE — 7100000010 HC PHASE II RECOVERY - FIRST 15 MIN: Performed by: SURGERY

## 2024-05-02 PROCEDURE — 2500000003 HC RX 250 WO HCPCS

## 2024-05-02 PROCEDURE — C1894 INTRO/SHEATH, NON-LASER: HCPCS | Performed by: SURGERY

## 2024-05-02 PROCEDURE — 3600000006 HC SURGERY LEVEL 6 BASE: Performed by: SURGERY

## 2024-05-02 PROCEDURE — 2580000003 HC RX 258: Performed by: SURGERY

## 2024-05-02 PROCEDURE — 2580000003 HC RX 258

## 2024-05-02 PROCEDURE — 6360000004 HC RX CONTRAST MEDICATION: Performed by: SURGERY

## 2024-05-02 PROCEDURE — 7100000011 HC PHASE II RECOVERY - ADDTL 15 MIN: Performed by: SURGERY

## 2024-05-02 PROCEDURE — C1874 STENT, COATED/COV W/DEL SYS: HCPCS | Performed by: SURGERY

## 2024-05-02 PROCEDURE — 3700000000 HC ANESTHESIA ATTENDED CARE: Performed by: SURGERY

## 2024-05-02 PROCEDURE — C1760 CLOSURE DEV, VASC: HCPCS | Performed by: SURGERY

## 2024-05-02 PROCEDURE — 7100000000 HC PACU RECOVERY - FIRST 15 MIN: Performed by: SURGERY

## 2024-05-02 PROCEDURE — 3600000016 HC SURGERY LEVEL 6 ADDTL 15MIN: Performed by: SURGERY

## 2024-05-02 PROCEDURE — 3700000001 HC ADD 15 MINUTES (ANESTHESIA): Performed by: SURGERY

## 2024-05-02 PROCEDURE — 6370000000 HC RX 637 (ALT 250 FOR IP): Performed by: SURGERY

## 2024-05-02 PROCEDURE — C1887 CATHETER, GUIDING: HCPCS | Performed by: SURGERY

## 2024-05-02 DEVICE — VIABAHN BX BALLOON EXP ENDO 7MMX29MM 7FR 135CMCATH HEPARIN
Type: IMPLANTABLE DEVICE | Site: SUBCLAVIAN | Status: FUNCTIONAL
Brand: GORE VIABAHN VBX BALLOON EXPANDABLE ENDO

## 2024-05-02 RX ORDER — MIDAZOLAM HYDROCHLORIDE 1 MG/ML
INJECTION INTRAMUSCULAR; INTRAVENOUS PRN
Status: DISCONTINUED | OUTPATIENT
Start: 2024-05-02 | End: 2024-05-02 | Stop reason: SDUPTHER

## 2024-05-02 RX ORDER — CLOPIDOGREL BISULFATE 75 MG/1
75 TABLET ORAL DAILY
Qty: 30 TABLET | Refills: 11 | Status: SHIPPED | OUTPATIENT
Start: 2024-05-02

## 2024-05-02 RX ORDER — ONDANSETRON 2 MG/ML
INJECTION INTRAMUSCULAR; INTRAVENOUS PRN
Status: DISCONTINUED | OUTPATIENT
Start: 2024-05-02 | End: 2024-05-02 | Stop reason: SDUPTHER

## 2024-05-02 RX ORDER — FENTANYL CITRATE 50 UG/ML
25 INJECTION, SOLUTION INTRAMUSCULAR; INTRAVENOUS EVERY 5 MIN PRN
Status: DISCONTINUED | OUTPATIENT
Start: 2024-05-02 | End: 2024-05-02 | Stop reason: HOSPADM

## 2024-05-02 RX ORDER — LIDOCAINE HYDROCHLORIDE 20 MG/ML
INJECTION, SOLUTION EPIDURAL; INFILTRATION; INTRACAUDAL; PERINEURAL PRN
Status: DISCONTINUED | OUTPATIENT
Start: 2024-05-02 | End: 2024-05-02 | Stop reason: SDUPTHER

## 2024-05-02 RX ORDER — DEXAMETHASONE SODIUM PHOSPHATE 4 MG/ML
4 INJECTION, SOLUTION INTRA-ARTICULAR; INTRALESIONAL; INTRAMUSCULAR; INTRAVENOUS; SOFT TISSUE
Status: DISCONTINUED | OUTPATIENT
Start: 2024-05-02 | End: 2024-05-02 | Stop reason: HOSPADM

## 2024-05-02 RX ORDER — NALOXONE HYDROCHLORIDE 0.4 MG/ML
INJECTION, SOLUTION INTRAMUSCULAR; INTRAVENOUS; SUBCUTANEOUS PRN
Status: DISCONTINUED | OUTPATIENT
Start: 2024-05-02 | End: 2024-05-02 | Stop reason: HOSPADM

## 2024-05-02 RX ORDER — ACETAMINOPHEN 325 MG/1
650 TABLET ORAL
Status: DISCONTINUED | OUTPATIENT
Start: 2024-05-02 | End: 2024-05-02 | Stop reason: HOSPADM

## 2024-05-02 RX ORDER — FENTANYL CITRATE 50 UG/ML
100 INJECTION, SOLUTION INTRAMUSCULAR; INTRAVENOUS
Status: DISCONTINUED | OUTPATIENT
Start: 2024-05-02 | End: 2024-05-02 | Stop reason: HOSPADM

## 2024-05-02 RX ORDER — SODIUM CHLORIDE 9 MG/ML
INJECTION, SOLUTION INTRAVENOUS PRN
Status: DISCONTINUED | OUTPATIENT
Start: 2024-05-02 | End: 2024-05-02 | Stop reason: HOSPADM

## 2024-05-02 RX ORDER — SODIUM CHLORIDE 0.9 % (FLUSH) 0.9 %
5-40 SYRINGE (ML) INJECTION PRN
Status: DISCONTINUED | OUTPATIENT
Start: 2024-05-02 | End: 2024-05-02 | Stop reason: HOSPADM

## 2024-05-02 RX ORDER — SODIUM CHLORIDE 0.9 % (FLUSH) 0.9 %
5-40 SYRINGE (ML) INJECTION EVERY 12 HOURS SCHEDULED
Status: DISCONTINUED | OUTPATIENT
Start: 2024-05-02 | End: 2024-05-02 | Stop reason: HOSPADM

## 2024-05-02 RX ORDER — HYDRALAZINE HYDROCHLORIDE 20 MG/ML
10 INJECTION INTRAMUSCULAR; INTRAVENOUS
Status: DISCONTINUED | OUTPATIENT
Start: 2024-05-02 | End: 2024-05-02 | Stop reason: HOSPADM

## 2024-05-02 RX ORDER — HYDROMORPHONE HYDROCHLORIDE 1 MG/ML
0.25 INJECTION, SOLUTION INTRAMUSCULAR; INTRAVENOUS; SUBCUTANEOUS EVERY 5 MIN PRN
Status: DISCONTINUED | OUTPATIENT
Start: 2024-05-02 | End: 2024-05-02 | Stop reason: HOSPADM

## 2024-05-02 RX ORDER — SUCCINYLCHOLINE CHLORIDE 20 MG/ML
INJECTION INTRAMUSCULAR; INTRAVENOUS PRN
Status: DISCONTINUED | OUTPATIENT
Start: 2024-05-02 | End: 2024-05-02 | Stop reason: SDUPTHER

## 2024-05-02 RX ORDER — MIDAZOLAM HYDROCHLORIDE 2 MG/2ML
2 INJECTION, SOLUTION INTRAMUSCULAR; INTRAVENOUS
Status: DISCONTINUED | OUTPATIENT
Start: 2024-05-02 | End: 2024-05-02 | Stop reason: HOSPADM

## 2024-05-02 RX ORDER — SODIUM CHLORIDE, SODIUM LACTATE, POTASSIUM CHLORIDE, CALCIUM CHLORIDE 600; 310; 30; 20 MG/100ML; MG/100ML; MG/100ML; MG/100ML
INJECTION, SOLUTION INTRAVENOUS CONTINUOUS
Status: DISCONTINUED | OUTPATIENT
Start: 2024-05-02 | End: 2024-05-02 | Stop reason: HOSPADM

## 2024-05-02 RX ORDER — OXYCODONE HYDROCHLORIDE 5 MG/1
5 TABLET ORAL
Status: DISCONTINUED | OUTPATIENT
Start: 2024-05-02 | End: 2024-05-02 | Stop reason: HOSPADM

## 2024-05-02 RX ORDER — HEPARIN SODIUM 5000 [USP'U]/ML
INJECTION, SOLUTION INTRAVENOUS; SUBCUTANEOUS PRN
Status: DISCONTINUED | OUTPATIENT
Start: 2024-05-02 | End: 2024-05-02 | Stop reason: SDUPTHER

## 2024-05-02 RX ORDER — SODIUM CHLORIDE, SODIUM LACTATE, POTASSIUM CHLORIDE, CALCIUM CHLORIDE 600; 310; 30; 20 MG/100ML; MG/100ML; MG/100ML; MG/100ML
INJECTION, SOLUTION INTRAVENOUS CONTINUOUS PRN
Status: DISCONTINUED | OUTPATIENT
Start: 2024-05-02 | End: 2024-05-02 | Stop reason: SDUPTHER

## 2024-05-02 RX ORDER — PROCHLORPERAZINE EDISYLATE 5 MG/ML
5 INJECTION INTRAMUSCULAR; INTRAVENOUS
Status: DISCONTINUED | OUTPATIENT
Start: 2024-05-02 | End: 2024-05-02 | Stop reason: HOSPADM

## 2024-05-02 RX ORDER — FENTANYL CITRATE 50 UG/ML
INJECTION, SOLUTION INTRAMUSCULAR; INTRAVENOUS PRN
Status: DISCONTINUED | OUTPATIENT
Start: 2024-05-02 | End: 2024-05-02 | Stop reason: SDUPTHER

## 2024-05-02 RX ORDER — ONDANSETRON 2 MG/ML
4 INJECTION INTRAMUSCULAR; INTRAVENOUS ONCE
Status: DISCONTINUED | OUTPATIENT
Start: 2024-05-02 | End: 2024-05-02 | Stop reason: HOSPADM

## 2024-05-02 RX ORDER — ACETAMINOPHEN 500 MG
1000 TABLET ORAL ONCE
Status: COMPLETED | OUTPATIENT
Start: 2024-05-02 | End: 2024-05-02

## 2024-05-02 RX ORDER — ROCURONIUM BROMIDE 10 MG/ML
INJECTION, SOLUTION INTRAVENOUS PRN
Status: DISCONTINUED | OUTPATIENT
Start: 2024-05-02 | End: 2024-05-02 | Stop reason: SDUPTHER

## 2024-05-02 RX ORDER — SODIUM CHLORIDE, SODIUM LACTATE, POTASSIUM CHLORIDE, CALCIUM CHLORIDE 600; 310; 30; 20 MG/100ML; MG/100ML; MG/100ML; MG/100ML
INJECTION, SOLUTION INTRAVENOUS ONCE
Status: COMPLETED | OUTPATIENT
Start: 2024-05-02 | End: 2024-05-02

## 2024-05-02 RX ORDER — DEXAMETHASONE SODIUM PHOSPHATE 4 MG/ML
INJECTION, SOLUTION INTRA-ARTICULAR; INTRALESIONAL; INTRAMUSCULAR; INTRAVENOUS; SOFT TISSUE PRN
Status: DISCONTINUED | OUTPATIENT
Start: 2024-05-02 | End: 2024-05-02 | Stop reason: SDUPTHER

## 2024-05-02 RX ADMIN — ONDANSETRON HYDROCHLORIDE 4 MG: 2 INJECTION, SOLUTION INTRAMUSCULAR; INTRAVENOUS at 10:43

## 2024-05-02 RX ADMIN — FENTANYL CITRATE 50 MCG: 50 INJECTION, SOLUTION INTRAMUSCULAR; INTRAVENOUS at 10:01

## 2024-05-02 RX ADMIN — WATER 2000 MG: 1 INJECTION INTRAMUSCULAR; INTRAVENOUS; SUBCUTANEOUS at 10:11

## 2024-05-02 RX ADMIN — HEPARIN SODIUM 6000 UNITS: 5000 INJECTION INTRAVENOUS; SUBCUTANEOUS at 10:19

## 2024-05-02 RX ADMIN — SUGAMMADEX 200 MG: 100 INJECTION, SOLUTION INTRAVENOUS at 10:41

## 2024-05-02 RX ADMIN — PHENYLEPHRINE HYDROCHLORIDE 40 MCG/MIN: 10 INJECTION INTRAVENOUS at 10:14

## 2024-05-02 RX ADMIN — SODIUM CHLORIDE, POTASSIUM CHLORIDE, SODIUM LACTATE AND CALCIUM CHLORIDE: 600; 310; 30; 20 INJECTION, SOLUTION INTRAVENOUS at 09:56

## 2024-05-02 RX ADMIN — SODIUM CHLORIDE, POTASSIUM CHLORIDE, SODIUM LACTATE AND CALCIUM CHLORIDE: 600; 310; 30; 20 INJECTION, SOLUTION INTRAVENOUS at 08:28

## 2024-05-02 RX ADMIN — FENTANYL CITRATE 50 MCG: 50 INJECTION, SOLUTION INTRAMUSCULAR; INTRAVENOUS at 10:20

## 2024-05-02 RX ADMIN — ACETAMINOPHEN 1000 MG: 500 TABLET ORAL at 08:01

## 2024-05-02 RX ADMIN — PROPOFOL 100 MG: 10 INJECTION, EMULSION INTRAVENOUS at 10:01

## 2024-05-02 RX ADMIN — DEXAMETHASONE SODIUM PHOSPHATE 4 MG: 4 INJECTION, SOLUTION INTRAMUSCULAR; INTRAVENOUS at 10:08

## 2024-05-02 RX ADMIN — PHENYLEPHRINE HYDROCHLORIDE 40 MCG: 10 INJECTION INTRAVENOUS at 10:04

## 2024-05-02 RX ADMIN — SUCCINYLCHOLINE CHLORIDE 120 MG: 20 INJECTION, SOLUTION INTRAMUSCULAR; INTRAVENOUS at 10:01

## 2024-05-02 RX ADMIN — ROCURONIUM BROMIDE 25 MG: 10 INJECTION INTRAVENOUS at 10:11

## 2024-05-02 RX ADMIN — LIDOCAINE HYDROCHLORIDE 80 MG: 20 INJECTION, SOLUTION EPIDURAL; INFILTRATION; INTRACAUDAL; PERINEURAL at 10:01

## 2024-05-02 RX ADMIN — ROCURONIUM BROMIDE 5 MG: 10 INJECTION INTRAVENOUS at 10:01

## 2024-05-02 RX ADMIN — Medication 3 AMPULE: at 08:02

## 2024-05-02 RX ADMIN — MIDAZOLAM HYDROCHLORIDE 2 MG: 1 INJECTION, SOLUTION INTRAMUSCULAR; INTRAVENOUS at 09:56

## 2024-05-02 ASSESSMENT — ENCOUNTER SYMPTOMS
SHORTNESS OF BREATH: 1
DYSPNEA ACTIVITY LEVEL: AFTER AMBULATING 1 FLIGHT OF STAIRS

## 2024-05-02 ASSESSMENT — PAIN DESCRIPTION - DESCRIPTORS: DESCRIPTORS: ACHING;THROBBING

## 2024-05-02 ASSESSMENT — PAIN - FUNCTIONAL ASSESSMENT: PAIN_FUNCTIONAL_ASSESSMENT: 0-10

## 2024-05-02 NOTE — ANESTHESIA PRE PROCEDURE
Department of Anesthesiology  Preprocedure Note       Name:  Echo Smith   Age:  48 y.o.  :  1975                                          MRN:  [id number]         Date:  2024      Surgeon: Surgeon(s):  John Khan MD    Procedure: Procedure(s):  ANGIOPLASTY AND STENTING OF RIGHT SUBCLAVIAN ARTERY (REQ HYBRID ROOM)    Medications prior to admission:   Prior to Admission medications    Medication Sig Start Date End Date Taking? Authorizing Provider   cetirizine (ZYRTEC) 10 MG tablet Take 1 tablet by mouth once daily 24   Carole Matthew APRN - NP   SUMAtriptan Succinate Refill 6 MG/0.5ML SOCT Inject 1 Syringe into the skin as needed (migraine) 24   Erich Suarez APRN - NP   AJOVY 225 MG/1.5ML SOSY Inject 1.5 mLs into the skin every 30 days 24   Erich Suarez APRN - NP   atorvastatin (LIPITOR) 40 MG tablet Take 1 tablet by mouth daily 23   ProviderVanessa MD   aspirin 81 MG chewable tablet Take 1 tablet by mouth daily    ProviderVanessa MD   pantoprazole (PROTONIX) 40 MG tablet Take 1 tablet by mouth daily 23   Carole Matthew APRN - NP       Current medications:    No current facility-administered medications for this encounter.     Current Outpatient Medications   Medication Sig Dispense Refill   • cetirizine (ZYRTEC) 10 MG tablet Take 1 tablet by mouth once daily 90 tablet 1   • SUMAtriptan Succinate Refill 6 MG/0.5ML SOCT Inject 1 Syringe into the skin as needed (migraine) 3 mL 3   • AJOVY 225 MG/1.5ML SOSY Inject 1.5 mLs into the skin every 30 days 1.5 mL 5   • atorvastatin (LIPITOR) 40 MG tablet Take 1 tablet by mouth daily     • aspirin 81 MG chewable tablet Take 1 tablet by mouth daily     • pantoprazole (PROTONIX) 40 MG tablet Take 1 tablet by mouth daily 90 tablet 1       Allergies:  No Known Allergies    Problem List:    Patient Active Problem List   Diagnosis Code   • Alkaline phosphatase raised R74.8   • Generalized

## 2024-05-02 NOTE — BRIEF OP NOTE
Brief Postoperative Note      Patient: Echo Smith  YOB: 1975  MRN: [id number]    Date of Procedure: 5/2/2024    Pre-Op Diagnosis Codes:     * PAD (peripheral artery disease) ([facility]) [I73.9]    Post-Op Diagnosis: Same       Procedure(s):  ANGIOPLASTY AND STENTING OF RIGHT SUBCLAVIAN ARTERY    Surgeon(s):  John Khan MD    Assistant:  First Assistant: Nancy Larose RN    Anesthesia: General    Estimated Blood Loss (mL): Minimal    Complications: None    Specimens:   * No specimens in log *    Implants:  Implant Name Type Inv. Item Serial No.  Lot No. LRB No. Used Action   STENT PERIPH L29MM [id number] CATH L135CM INTRO SHTH 7FR - [id number] Peripheral stents STENT PERIPH L29MM [id number] CATH L135CM INTRO SHTH 7FR [id number] [employer] N/A Right 1 Implanted         Drains: * No LDAs found *    Findings:  Infection Present At Time Of Surgery (PATOS) (choose all levels that have infection present):  No infection present  Other Findings: Severe stenosis in aberrant Rt SCA PTA/stent w/ 7x29 VBX w/ good result.    Electronically signed by John Khan MD on 5/2/2024 at 11:14 AM

## 2024-05-02 NOTE — ANESTHESIA POSTPROCEDURE EVALUATION
Department of Anesthesiology  Postprocedure Note    Patient: Echo Smith  MRN: [id number]  YOB: 1975  Date of evaluation: 5/2/2024    Procedure Summary       Date: 05/02/24 Room / Location: \[facility]\"" MAIN OR 0 / \[facility]\"" MAIN OR    Anesthesia Start: 0956 Anesthesia Stop: 1103    Procedure: ANGIOPLASTY AND STENTING OF RIGHT SUBCLAVIAN ARTERY (Right: Groin) Diagnosis:       PAD (peripheral artery disease) ([facility])      (PAD (peripheral artery disease) ([facility]) [I73.9])    Providers: John Khan MD Responsible Provider: Hector Nicholson MD    Anesthesia Type: General ASA Status: 3            Anesthesia Type: General    Marylou Phase I:      Marylou Phase II:      Anesthesia Post Evaluation    Patient location during evaluation: PACU  Patient participation: complete - patient participated  Level of consciousness: sleepy but conscious and responsive to verbal stimuli  Pain score: 1  Airway patency: patent  Nausea & Vomiting: no vomiting and no nausea  Cardiovascular status: blood pressure returned to baseline and hemodynamically stable  Respiratory status: acceptable  Hydration status: stable  Multimodal analgesia pain management approach  Pain management: adequate    No notable events documented.

## 2024-05-02 NOTE — H&P
History and Physical    Subjective:     Echo Smith is a 48 y.o. female who has Ischemic rest pain and claudication in Rt hand and a severe stenosis at the origin of an aberrant Rt SCA.    Past Medical History:   Diagnosis Date    Adrenal insufficiency (HCC) 2023    Atherosclerotic heart disease     nonobstructive CAD    Chest pain     Depression     Generalized anxiety disorder     GERD (gastroesophageal reflux disease)     Hypercholesterolemia     Hypertension     Hypothyroidism     IBS (irritable bowel syndrome)     Moderate recurrent major depression (HCC)     Osteoarthritis of right knee     PAD (peripheral artery disease) ([facility])     Palpitations     SOB (shortness of breath) on exertion     Tendinitis of right rotator cuff       Past Surgical History:   Procedure Laterality Date    APPENDECTOMY      CARDIAC PROCEDURE N/A 2023    Left heart cath / coronary angiography performed by Malik Reddy MD at [facility] CARDIAC CATH LAB    COLONOSCOPY      HYSTERECTOMY (CERVIX STATUS UNKNOWN)      ovaries remain    INVASIVE VASCULAR N/A 2023    Ultrasound guided vascular access performed by Malik Reddy MD at [facility] CARDIAC CATH LAB    KNEE ARTHROSCOPY Right     OVARY REMOVAL Bilateral      Family History   Problem Relation Age of Onset    Breast Cancer Mother     Stroke Father     Heart Disease Father     Heart Attack Father 45    Pancreatic Cancer Father       Social History     Tobacco Use    Smoking status: Former     Current packs/day: 0.00     Types: Cigarettes     Quit date:      Years since quittin.3    Smokeless tobacco: Never   Substance Use Topics    Alcohol use: Not Currently       Prior to Admission medications    Medication Sig Start Date End Date Taking? Authorizing Provider   cetirizine (ZYRTEC) 10 MG tablet Take 1 tablet by mouth once daily 24   Carole Matthew, APRN - NP   SUMAtriptan Succinate Refill 6 MG/0.5ML SOCT Inject 1 Syringe into the skin as needed

## 2024-05-02 NOTE — DISCHARGE INSTRUCTIONS
Patient Discharge Instructions    Echo Smith / [id number] : 1975    Admitted 2024 Discharged: 2024     Take Home Medications         It is important that you take the medication exactly as they are prescribed.   Keep your medication in the bottles provided by the pharmacist and keep a list of the medication names, dosages, and times to be taken in your wallet.   Do not take other medications without consulting your doctor.       What to do at Home    Wound Care: None needed.  OK to shower tomorrow.    Recommended diet: AHA    Recommended activity: As Tolerated. No Strenuous activity or heavy lifting for 7 days.    If you experience any of the following symptoms severe pain, numbness, weakness, or discoloration in right arm or hand or increasing swelling or drainage at right groin site, please follow up with Dr Khan or ER immediately.    Follow-up with Dr Khan in 2-4 weeks [phone]        Information obtained by :  I understand that if any problems occur once I am at home I am to contact my physician.    I understand and acknowledge receipt of the instructions indicated above.                                                                                                                                           Physician's or R.N.'s Signature                                                                  Date/Time                                                                                                                                              Patient or Representative Signature                                                          Date/Time  DISCHARGE SUMMARY from Nurse    PATIENT INSTRUCTIONS:    After general anesthesia or intravenous sedation, for 24 hours or while taking prescription narcotics:    Have someone responsible help you with your care  Limit your activities  Do not drive and operate hazardous machinery  Do not make important personal, legal or business  gave you when you take a shower.     Shower with this soap until your wounds are healed.      To reach all areas of your body, you may need someone to help you.     Don’t forget to clean your belly button with every shower.     USE CLEAN SHEETS    Use freshly cleaned sheets on your bed after surgery.     To keep the surgery site clean, do not allow pets to sleep with you while your wound is still healing.     STOP SMOKING    Stop smoking, or at least cut back on smoking    Smoking slows your healing.      CONTROL YOUR BLOOD SUGAR    High blood sugars slow wound healing.    If you are diabetic, control your blood sugar levels before and after your surgery.    Please take time to review all of your Home Care Instructions and Medication Information sheets provided in your discharge packet. If you have any questions, please contact your surgeon's office. Thank you.    The discharge information has been reviewed with the patient and instruction recipient.  The patient and instruction recipient verbalized understanding.  Discharge medications reviewed with the patient and instruction recipient and appropriate educational materials and side effects teaching were provided.      Please provide this summary of care documentation to your next provider.

## 2024-05-20 NOTE — OP NOTE
[facility]              [address] [place]  [address]                            OPERATIVE REPORT      PATIENT NAME: FELIPE NUÑEZ              : 1975  MED REC NO: [id number]                       ROOM: OR  ACCOUNT NO: [id number]                       ADMIT DATE: 2024  PROVIDER: John Khan MD    DATE OF SERVICE:  2024    PREOPERATIVE DIAGNOSES:  Right upper extremity ischemia with claudication and rest pain.    POSTOPERATIVE DIAGNOSES:  Right upper extremity ischemia with claudication and rest pain.    PROCEDURES PERFORMED:       1. Thoracic aortogram.     2. Right subclavian arteriogram.     3. Angioplasty and stenting of right subclavian artery.    SURGEON:  John Khan MD    ASSISTANT:  None.    ANESTHESIA:  General.    ESTIMATED BLOOD LOSS:  Minimal.    SPECIMENS REMOVED:  None.    INTRAOPERATIVE FINDINGS:  Severe stenosis in aberrant right subclavian artery, successfully treated.     COMPLICATIONS:  None.    IMPLANTS:  7 x 29 VBX stent in the right subclavian artery.    INDICATIONS:  The patient is a 48-year-old female, who presented with right arm claudication and intermittent ischemic rest pain of the right hand.  Physical exam and noninvasive studies were consistent with ischemic pain.  CT scan of the chest showed an aberrant right subclavian artery with apparent stenosis.  The patient presents for right subclavian arteriogram with possible angioplasty and stenting.    DESCRIPTION OF PROCEDURE:  After informed consent was obtained, the patient was given intravenous antibiotics within 1 hour of the procedure.  She was taken to the operating room and after induction of adequate general anesthesia, both groins were prepped and draped as a sterile field.  Under real-time ultrasound guidance, the right common femoral artery was percutaneously cannulated and a 6-Slovak sheath was placed.  A Glidewire was navigated up  to the aortic arch and the sheath was exchanged for a long 7-Romanian TourGuide sheath and the patient was systemically heparinized.  A thoracic aortogram was performed in slight THOMAS projection with the TourGuide sheath in the mid aortic arch.  Injection through the TourGuide sheath showed that the mid to distal thoracic aortic arch was normal with the exception of the expected aberrant right subclavian artery.  The left subclavian artery was normal.  The right subclavian artery originated distal to the left subclavian artery on the posteromedial aspect of the distal arch and proximal descending thoracic aorta.  The descending thoracic aorta was normal.  There is no aneurysm in the aberrant right subclavian artery.  The origin of the right subclavian artery appeared to be severely stenotic.  The right subclavian artery was selectively cannulated with a Glidewire in the TourGuide sheath and a selective right subclavian artery angiogram was done.  This confirmed that there was an 80% to 90% stenosis at the origin and in the proximal segment of the right subclavian artery with some poststenotic dilatation.  The right vertebral artery takeoff was well distal to this.  The remainder of the right subclavian and right axillary arteries were normal.  The proximal right brachial artery was normal.  The severe stenosis of the origin of the right subclavian was angioplastied and stented with a 7 x 29 balloon expandable Gleason VBX covered stent.  Completion arteriograms revealed an excellent result with good position of the stent and no residual stenosis.  Catheters and wires were withdrawn and the right femoral access site was closed with a Perclose device with good hemostasis.  Dermabond was applied to the puncture site.  The patient was extubated and transferred to the PACU in stable condition, having tolerated the procedure well.    SURGERY DATE:  05/02/2024.    DRAINS:  None.        MANAS MACIAS MD      IRINA/AQS  D:

## 2024-06-24 DIAGNOSIS — K21.9 GASTROESOPHAGEAL REFLUX DISEASE WITHOUT ESOPHAGITIS: ICD-10-CM

## 2024-06-24 RX ORDER — PANTOPRAZOLE SODIUM 40 MG/1
40 TABLET, DELAYED RELEASE ORAL DAILY
Qty: 90 TABLET | Refills: 1 | Status: SHIPPED | OUTPATIENT
Start: 2024-06-24

## 2024-07-18 PROBLEM — R22.1 LOCALIZED SWELLING, MASS AND LUMP, NECK: Status: ACTIVE | Noted: 2024-07-18

## 2024-07-18 PROBLEM — R13.12 DYSPHAGIA, OROPHARYNGEAL: Status: ACTIVE | Noted: 2024-07-18

## 2024-08-12 ENCOUNTER — CLINICAL DOCUMENTATION (OUTPATIENT)
Age: 49
End: 2024-08-12

## 2024-08-22 ENCOUNTER — TELEPHONE (OUTPATIENT)
Age: 49
End: 2024-08-22

## 2024-08-22 NOTE — TELEPHONE ENCOUNTER
Hi,     Caller is requesting pt's \"medical abstracts, procedures, radiology reports, labs & office notes\" faxed to "[employer]. Caller says, "[employer] only received the cover sheet of pt's rec's. Please send all medical rec's.     Thank you.

## 2024-08-27 ENCOUNTER — TELEPHONE (OUTPATIENT)
Age: 49
End: 2024-08-27

## 2024-08-27 RX ORDER — METHYLPREDNISOLONE 4 MG
TABLET, DOSE PACK ORAL
Qty: 1 KIT | Refills: 0 | Status: SHIPPED | OUTPATIENT
Start: 2024-08-27 | End: 2024-09-02

## 2024-08-27 NOTE — TELEPHONE ENCOUNTER
Patient is having migraines which are causing her to throw up, been going on for several days. Ajovy is not helping.   Pls call to discuss.

## 2024-09-29 DIAGNOSIS — T78.40XA ALLERGY, INITIAL ENCOUNTER: ICD-10-CM

## 2024-09-29 RX ORDER — CETIRIZINE HYDROCHLORIDE 10 MG/1
10 TABLET, FILM COATED ORAL DAILY
Qty: 90 TABLET | Refills: 0 | Status: SHIPPED | OUTPATIENT
Start: 2024-09-29

## 2024-10-10 DIAGNOSIS — G43.711 CHRONIC MIGRAINE WITHOUT AURA, INTRACTABLE, WITH STATUS MIGRAINOSUS: ICD-10-CM

## 2024-10-10 RX ORDER — FREMANEZUMAB-VFRM 225 MG/1.5ML
INJECTION SUBCUTANEOUS
Qty: 1.5 ML | Refills: 5 | Status: SHIPPED | OUTPATIENT
Start: 2024-10-10

## 2024-10-16 ENCOUNTER — OFFICE VISIT (OUTPATIENT)
Age: 49
End: 2024-10-16
Payer: MEDICAID

## 2024-10-16 VITALS
BODY MASS INDEX: 21.22 KG/M2 | HEIGHT: 60 IN | DIASTOLIC BLOOD PRESSURE: 75 MMHG | WEIGHT: 108.06 LBS | SYSTOLIC BLOOD PRESSURE: 114 MMHG

## 2024-10-16 DIAGNOSIS — Z08 VAGINAL PAP SMEAR FOLLOWING HYSTERECTOMY FOR MALIGNANCY: Primary | ICD-10-CM

## 2024-10-16 DIAGNOSIS — Z78.0 MENOPAUSE: ICD-10-CM

## 2024-10-16 DIAGNOSIS — Z11.51 SCREENING FOR HUMAN PAPILLOMAVIRUS: ICD-10-CM

## 2024-10-16 DIAGNOSIS — Z00.00 ANNUAL VISIT FOR GENERAL ADULT MEDICAL EXAMINATION WITHOUT ABNORMAL FINDINGS: ICD-10-CM

## 2024-10-16 DIAGNOSIS — Z90.710 VAGINAL PAP SMEAR FOLLOWING HYSTERECTOMY FOR MALIGNANCY: Primary | ICD-10-CM

## 2024-10-16 DIAGNOSIS — N39.3 STRESS INCONTINENCE OF URINE: ICD-10-CM

## 2024-10-16 PROBLEM — R45.89 ANXIETY ABOUT HEALTH: Status: ACTIVE | Noted: 2022-07-05

## 2024-10-16 PROBLEM — F32.A DEPRESSION: Status: ACTIVE | Noted: 2022-07-05

## 2024-10-16 PROCEDURE — 99396 PREV VISIT EST AGE 40-64: CPT | Performed by: OBSTETRICS & GYNECOLOGY

## 2024-10-16 RX ORDER — LEVOTHYROXINE SODIUM 112 UG/1
112 TABLET ORAL EVERY MORNING
COMMUNITY

## 2024-10-16 RX ORDER — GABAPENTIN 300 MG/1
CAPSULE ORAL
COMMUNITY
Start: 2024-09-09

## 2024-10-16 RX ORDER — VENLAFAXINE HYDROCHLORIDE 75 MG/1
75 CAPSULE, EXTENDED RELEASE ORAL DAILY
COMMUNITY
Start: 2024-09-23

## 2024-10-16 RX ORDER — TOPIRAMATE 50 MG/1
50 TABLET, FILM COATED ORAL NIGHTLY
COMMUNITY
Start: 2024-09-03

## 2024-10-16 RX ORDER — ACETAMINOPHEN AND CODEINE PHOSPHATE 300; 30 MG/1; MG/1
TABLET ORAL
COMMUNITY
Start: 2024-07-22

## 2024-10-16 NOTE — PROGRESS NOTES
Echo Smith is a 49 y.o. female who presents today for the following:  Chief Complaint   Patient presents with    Annual Exam     Pt presents for annual exam with complaints of hot flashes and menopause symptoms//MKeeley         No Known Allergies    Current Outpatient Medications   Medication Sig Dispense Refill    acetaminophen-codeine (TYLENOL #3) 300-30 MG per tablet TAKE 1 TABLET BY MOUTH 4 TIMES DAILY AS NEEDED      gabapentin (NEURONTIN) 300 MG capsule TAKE 1 CAPSULE BY MOUTH TWICE DAILY AND 2 AT BEDTIME      topiramate (TOPAMAX) 50 MG tablet Take 1 tablet by mouth nightly      venlafaxine (EFFEXOR XR) 75 MG extended release capsule Take 1 capsule by mouth daily      levothyroxine (SYNTHROID) 112 MCG tablet Take 1 tablet by mouth every morning      AJOVY 225 MG/1.5ML SOSY INJECT 1 & 1/2 (ONE & ONE-HALF) ML  ONCE EVERY MONTH 1.5 mL 5    EQ ALLERGY RELIEF, CETIRIZINE, 10 MG tablet Take 1 tablet by mouth once daily 90 tablet 0    clopidogrel (PLAVIX) 75 MG tablet Take 1 tablet by mouth daily 30 tablet 11    SUMAtriptan Succinate Refill 6 MG/0.5ML SOCT Inject 1 Syringe into the skin as needed (migraine) 3 mL 3    atorvastatin (LIPITOR) 40 MG tablet Take 1 tablet by mouth daily      aspirin 81 MG chewable tablet Take 1 tablet by mouth daily       No current facility-administered medications for this visit.       Past Medical History:   Diagnosis Date    Abnormal Pap smear of cervix     Not sure of all the dates    Adrenal insufficiency (HCC) 1/18/2023    Atherosclerotic heart disease 2023    nonobstructive CAD    Chest pain     Depression     Generalized anxiety disorder     GERD (gastroesophageal reflux disease)     Hypercholesterolemia     Hypertension     Hypothyroidism     IBS (irritable bowel syndrome)     Migraine     Moderate recurrent major depression (HCC)     Osteoarthritis of right knee     Overactive bladder     PAD (peripheral artery disease) (HCC)     Palpitations     SOB (shortness of breath) on

## 2024-10-17 ENCOUNTER — TELEPHONE (OUTPATIENT)
Facility: CLINIC | Age: 49
End: 2024-10-17

## 2024-10-17 LAB
ESTRADIOL SERPL-MCNC: <5 PG/ML
FSH SERPL-ACNC: 134 MIU/ML
LH SERPL-ACNC: 80.2 MIU/ML
PROLACTIN SERPL-MCNC: 9.9 NG/ML (ref 4.8–33.4)
T4 FREE SERPL-MCNC: 0.98 NG/DL (ref 0.82–1.77)
TSH SERPL DL<=0.005 MIU/L-ACNC: 7.71 UIU/ML (ref 0.45–4.5)

## 2024-10-17 NOTE — TELEPHONE ENCOUNTER
Called pt and Lvm to call the office, pt has an appt in the chart with Dr Pan on the 31st to be seen for this      ----- Message from BRAD Pradhan NP sent at 10/17/2024  9:11 AM EDT -----  Please clarify with patient if she is seeing endocrinologist. I see information from Dr. Matias. Thyroid levels were off when checked by GYN. If she is not seeing anyone for this then recommend follow up be scheduled with me to discuss.  ----- Message -----  From: Antonietta Dela Cruz Incoming Amb Ref Lab Orders To [facility]  Sent: 10/17/2024   7:40 AM EDT  To: BRAD Lovett NP

## 2024-10-19 LAB — TESTOST FREE SERPL-MCNC: 0.3 PG/ML (ref 0–4.2)

## 2024-10-23 LAB
., LABCORP: NORMAL
CYTOLOGIST CVX/VAG CYTO: NORMAL
CYTOLOGY CVX/VAG DOC CYTO: NORMAL
CYTOLOGY CVX/VAG DOC THIN PREP: NORMAL
DX ICD CODE: NORMAL
Lab: NORMAL
Lab: NORMAL
OTHER STN SPEC: NORMAL
STAT OF ADQ CVX/VAG CYTO-IMP: NORMAL

## 2024-10-28 ENCOUNTER — OFFICE VISIT (OUTPATIENT)
Age: 49
End: 2024-10-28
Payer: MEDICAID

## 2024-10-28 VITALS
DIASTOLIC BLOOD PRESSURE: 72 MMHG | HEART RATE: 87 BPM | SYSTOLIC BLOOD PRESSURE: 109 MMHG | HEIGHT: 60 IN | BODY MASS INDEX: 21.2 KG/M2 | WEIGHT: 108 LBS

## 2024-10-28 DIAGNOSIS — N39.3 STRESS INCONTINENCE DUE TO PELVIC ORGAN PROLAPSE: ICD-10-CM

## 2024-10-28 DIAGNOSIS — N30.01 ACUTE CYSTITIS WITH HEMATURIA: Primary | ICD-10-CM

## 2024-10-28 LAB
BILIRUBIN, URINE, POC: NEGATIVE
BLOOD URINE, POC: ABNORMAL
GLUCOSE URINE, POC: NEGATIVE
KETONES, URINE, POC: NEGATIVE
LEUKOCYTE ESTERASE, URINE, POC: ABNORMAL
NITRITE, URINE, POC: POSITIVE
PH, URINE, POC: 7 (ref 4.6–8)
PROTEIN,URINE, POC: NEGATIVE
SPECIFIC GRAVITY, URINE, POC: 1 (ref 1–1.03)
URINALYSIS CLARITY, POC: CLEAR
URINALYSIS COLOR, POC: YELLOW
UROBILINOGEN, POC: ABNORMAL

## 2024-10-28 PROCEDURE — 99204 OFFICE O/P NEW MOD 45 MIN: CPT | Performed by: STUDENT IN AN ORGANIZED HEALTH CARE EDUCATION/TRAINING PROGRAM

## 2024-10-28 PROCEDURE — 81003 URINALYSIS AUTO W/O SCOPE: CPT | Performed by: STUDENT IN AN ORGANIZED HEALTH CARE EDUCATION/TRAINING PROGRAM

## 2024-10-28 RX ORDER — PANTOPRAZOLE SODIUM 40 MG/1
TABLET, DELAYED RELEASE ORAL
COMMUNITY
Start: 2024-10-09

## 2024-10-28 RX ORDER — IBUPROFEN 800 MG/1
1 TABLET, FILM COATED ORAL EVERY 6 HOURS PRN
COMMUNITY

## 2024-10-28 NOTE — PROGRESS NOTES
HISTORY OF PRESENT ILLNESS  Echo Smith is a 49 y.o. female.  Chief Complaint   Patient presents with    New Patient     Stress incontinence       49-year-old female referred for pelvic organ prolapse and stress urinary incontinence.  She has 2 prior pregnancies with successful vaginal delivery.  She is also had a previous hysterectomy and has a sensation of a pressure at the opening of the vagina.  She was seen by her gynecologist who recommended she be seen by a urologist due to concern for pelvic organ prolapse.  She has also had issues with stress urinary incontinence and has to use multiple pads/liners during the day to control for this.        PAST MEDICAL HISTORY:  PMHx (including negatives):  has a past medical history of Abnormal Pap smear of cervix, Adrenal insufficiency (HCC), Atherosclerotic heart disease, Chest pain, Depression, Generalized anxiety disorder, GERD (gastroesophageal reflux disease), Hypercholesterolemia, Hypertension, Hypothyroidism, IBS (irritable bowel syndrome), Migraine, Moderate recurrent major depression (HCC), Osteoarthritis of right knee, Overactive bladder, PAD (peripheral artery disease) (HCC), Palpitations, SOB (shortness of breath) on exertion, and Tendinitis of right rotator cuff.   PSurgHx:  has a past surgical history that includes Appendectomy; Hysterectomy (2019); Ovary removal (Bilateral); Knee arthroscopy (Right); Colonoscopy; Cardiac procedure (N/A, 11/6/2023); invasive vascular (N/A, 11/6/2023); and vascular surgery (Right, 5/2/2024).  PSocHx:  reports that she has been smoking cigarettes. She has a 3.8 pack-year smoking history. She has never used smokeless tobacco. She reports that she does not currently use alcohol. She reports that she does not use drugs.     Review of Systems      Physical Exam  Constitutional:       General: She is not in acute distress.     Appearance: She is not ill-appearing.   HENT:      Head: Normocephalic and atraumatic.   Eyes:

## 2024-10-28 NOTE — PROGRESS NOTES
Chief Complaint   Patient presents with    New Patient     Stress incontinence     1. Have you been to the ER, urgent care clinic since your last visit?  Hospitalized since your last visit?No    2. Have you seen or consulted any other health care providers outside of the [facility] since your last visit?  Include any pap smears or colon screening. No  /72   Pulse 87   Ht 1.511 m (4' 11.5\")   Wt 49 kg (108 lb)   BMI 21.45 kg/m²

## 2024-10-29 LAB
APPEARANCE UR: CLEAR
BACTERIA #/AREA URNS HPF: ABNORMAL /[HPF]
BILIRUB UR QL STRIP: NEGATIVE
CASTS URNS QL MICRO: ABNORMAL /LPF
COLOR UR: YELLOW
EPI CELLS #/AREA URNS HPF: ABNORMAL /HPF (ref 0–10)
GLUCOSE UR QL STRIP: NEGATIVE
HGB UR QL STRIP: NEGATIVE
KETONES UR QL STRIP: NEGATIVE
LEUKOCYTE ESTERASE UR QL STRIP: ABNORMAL
MICRO URNS: ABNORMAL
NITRITE UR QL STRIP: POSITIVE
PH UR STRIP: 7 [PH] (ref 5–7.5)
PROT UR QL STRIP: NEGATIVE
RBC #/AREA URNS HPF: ABNORMAL /HPF (ref 0–2)
SP GR UR STRIP: 1.01 (ref 1–1.03)
UROBILINOGEN UR STRIP-MCNC: 0.2 MG/DL (ref 0.2–1)
WBC #/AREA URNS HPF: >30 /HPF (ref 0–5)

## 2024-11-02 LAB — BACTERIA UR CULT: ABNORMAL

## 2024-11-04 ENCOUNTER — PROCEDURE VISIT (OUTPATIENT)
Age: 49
End: 2024-11-04
Payer: MEDICAID

## 2024-11-04 ENCOUNTER — HOSPITAL ENCOUNTER (OUTPATIENT)
Facility: HOSPITAL | Age: 49
Discharge: HOME | End: 2024-11-06
Payer: MEDICAID

## 2024-11-04 VITALS
HEART RATE: 91 BPM | WEIGHT: 108 LBS | DIASTOLIC BLOOD PRESSURE: 74 MMHG | HEIGHT: 59 IN | SYSTOLIC BLOOD PRESSURE: 109 MMHG | BODY MASS INDEX: 21.77 KG/M2

## 2024-11-04 DIAGNOSIS — N32.81 OVERACTIVE BLADDER: ICD-10-CM

## 2024-11-04 DIAGNOSIS — I65.23 BILATERAL CAROTID ARTERY STENOSIS: ICD-10-CM

## 2024-11-04 DIAGNOSIS — N39.3 STRESS INCONTINENCE DUE TO PELVIC ORGAN PROLAPSE: ICD-10-CM

## 2024-11-04 DIAGNOSIS — N30.01 ACUTE CYSTITIS WITH HEMATURIA: Primary | ICD-10-CM

## 2024-11-04 LAB
AVG FLOW RATE, POC: 3.8
BILIRUBIN, URINE, POC: NEGATIVE
BLOOD URINE, POC: NEGATIVE
FLOW TIME, POC: 38
GLUCOSE URINE, POC: NEGATIVE
KETONES, URINE, POC: NEGATIVE
LEUKOCYTE ESTERASE, URINE, POC: ABNORMAL
MAX FLOW RATE, POC: 6.4
NITRITE, URINE, POC: NEGATIVE
PH, URINE, POC: 6.5 (ref 4.6–8)
PROTEIN,URINE, POC: NEGATIVE
PVR, POC: NORMAL CC
SPECIFIC GRAVITY, URINE, POC: 1.01 (ref 1–1.03)
TIME TO MAX, POC: 21
URINALYSIS CLARITY, POC: CLEAR
URINALYSIS COLOR, POC: YELLOW
UROBILINOGEN, POC: ABNORMAL
VOIDED VOLUME, POC: 140.7
VOIDING TIME, POC: 49

## 2024-11-04 PROCEDURE — 93880 EXTRACRANIAL BILAT STUDY: CPT

## 2024-11-04 PROCEDURE — 51798 US URINE CAPACITY MEASURE: CPT | Performed by: STUDENT IN AN ORGANIZED HEALTH CARE EDUCATION/TRAINING PROGRAM

## 2024-11-04 PROCEDURE — 52000 CYSTOURETHROSCOPY: CPT | Performed by: STUDENT IN AN ORGANIZED HEALTH CARE EDUCATION/TRAINING PROGRAM

## 2024-11-04 PROCEDURE — 51741 ELECTRO-UROFLOWMETRY FIRST: CPT | Performed by: STUDENT IN AN ORGANIZED HEALTH CARE EDUCATION/TRAINING PROGRAM

## 2024-11-04 PROCEDURE — 81003 URINALYSIS AUTO W/O SCOPE: CPT | Performed by: STUDENT IN AN ORGANIZED HEALTH CARE EDUCATION/TRAINING PROGRAM

## 2024-11-04 PROCEDURE — 51725 SIMPLE CYSTOMETROGRAM: CPT | Performed by: STUDENT IN AN ORGANIZED HEALTH CARE EDUCATION/TRAINING PROGRAM

## 2024-11-04 PROCEDURE — 99214 OFFICE O/P EST MOD 30 MIN: CPT | Performed by: STUDENT IN AN ORGANIZED HEALTH CARE EDUCATION/TRAINING PROGRAM

## 2024-11-04 RX ORDER — CIPROFLOXACIN 500 MG/1
500 TABLET, FILM COATED ORAL ONCE
Status: COMPLETED | OUTPATIENT
Start: 2024-11-04 | End: 2024-11-04

## 2024-11-04 RX ADMIN — CIPROFLOXACIN 500 MG: 500 TABLET, FILM COATED ORAL at 14:36

## 2024-11-04 NOTE — PROGRESS NOTES
Chief Complaint   Patient presents with    Procedure    Cystoscopy     1. Have you been to the ER, urgent care clinic since your last visit?  Hospitalized since your last visit?No    2. Have you seen or consulted any other health care providers outside of the [facility] since your last visit?  Include any pap smears or colon screening. No    
birth  Orders:  -     AMB POC URINALYSIS DIP STICK AUTO W/O MICRO  -     Culture, Urine  -     ciprofloxacin (CIPRO) tablet 500 mg; 500 mg, Oral, ONCE, 1 dose, On Mon 11/4/24 at 1415Antimicrobial Indications: Surgical ProphylaxisDo not take with dairy products or calcium-fortified juices. Tube feeding (TF) interaction, obtain physician order to manage. Recommend holding TF for 1 hr before and 1 hr after dose. Due to decreased absorption do not give by J tube.     3. Overactive bladder  Assessment & Plan:  New, not at goal.  Discussed options and given patient's urinary retention I recommended that she proceed with a sacral neuromodulation PNE trial in the operating room.         John Neal MD      Please note that portions of this note were completed with Dragon dictation, the computer voice recognition software.  Quite often unanticipated grammatical, syntax, homophones, and other interpretive errors are inadvertently transcribed by the computer software.  Please disregard these errors and any other errors that may have escaped final proofreading.  Thank you.

## 2024-11-06 DIAGNOSIS — N30.00 ACUTE CYSTITIS WITHOUT HEMATURIA: Primary | ICD-10-CM

## 2024-11-06 LAB
VAS LEFT CCA DIST EDV: 37.5 CM/S
VAS LEFT CCA DIST PSV: 100 CM/S
VAS LEFT CCA PROX EDV: 27.6 CM/S
VAS LEFT CCA PROX PSV: 91.2 CM/S
VAS LEFT ECA EDV: 15.6 CM/S
VAS LEFT ECA PSV: 74.3 CM/S
VAS LEFT ICA DIST EDV: 47.6 CM/S
VAS LEFT ICA DIST PSV: 107 CM/S
VAS LEFT ICA PROX EDV: 42.7 CM/S
VAS LEFT ICA PROX PSV: 96.7 CM/S
VAS LEFT ICA/CCA PSV: 0.97
VAS LEFT SUBCLAVIAN PROX EDV: 0 CM/S
VAS LEFT SUBCLAVIAN PROX PSV: 80.5 CM/S
VAS LEFT VERTEBRAL EDV: 22.4 CM/S
VAS LEFT VERTEBRAL PSV: 62.5 CM/S
VAS RIGHT CCA DIST EDV: 36 CM/S
VAS RIGHT CCA DIST PSV: 84.3 CM/S
VAS RIGHT CCA PROX EDV: 22.2 CM/S
VAS RIGHT CCA PROX PSV: 81 CM/S
VAS RIGHT ECA EDV: 20.6 CM/S
VAS RIGHT ECA PSV: 91.7 CM/S
VAS RIGHT ICA DIST EDV: 39.1 CM/S
VAS RIGHT ICA DIST PSV: 100 CM/S
VAS RIGHT ICA PROX EDV: 42 CM/S
VAS RIGHT ICA PROX PSV: 97.8 CM/S
VAS RIGHT ICA/CCA PSV: 1.16
VAS RIGHT SUBCLAVIAN PROX EDV: 0 CM/S
VAS RIGHT SUBCLAVIAN PROX PSV: 69.6 CM/S
VAS RIGHT VERTEBRAL EDV: 12.5 CM/S
VAS RIGHT VERTEBRAL PSV: 33 CM/S

## 2024-11-06 PROCEDURE — 93880 EXTRACRANIAL BILAT STUDY: CPT | Performed by: SURGERY

## 2024-11-06 RX ORDER — CIPROFLOXACIN 500 MG/1
500 TABLET, FILM COATED ORAL 2 TIMES DAILY
Qty: 14 TABLET | Refills: 0 | Status: SHIPPED | OUTPATIENT
Start: 2024-11-06 | End: 2024-11-13

## 2024-11-07 LAB — BACTERIA UR CULT: ABNORMAL

## 2024-11-13 ENCOUNTER — HOSPITAL ENCOUNTER (OUTPATIENT)
Facility: HOSPITAL | Age: 49
Discharge: HOME | End: 2024-11-16
Payer: MEDICAID

## 2024-11-13 ENCOUNTER — OFFICE VISIT (OUTPATIENT)
Age: 49
End: 2024-11-13
Payer: MEDICAID

## 2024-11-13 VITALS
WEIGHT: 107.5 LBS | SYSTOLIC BLOOD PRESSURE: 109 MMHG | DIASTOLIC BLOOD PRESSURE: 69 MMHG | HEIGHT: 59 IN | BODY MASS INDEX: 21.67 KG/M2

## 2024-11-13 DIAGNOSIS — E07.9 THYROID DISEASE: ICD-10-CM

## 2024-11-13 DIAGNOSIS — Z12.31 VISIT FOR SCREENING MAMMOGRAM: ICD-10-CM

## 2024-11-13 DIAGNOSIS — N95.1 SYMPTOMATIC MENOPAUSAL OR FEMALE CLIMACTERIC STATES: Primary | ICD-10-CM

## 2024-11-13 PROCEDURE — 99213 OFFICE O/P EST LOW 20 MIN: CPT | Performed by: OBSTETRICS & GYNECOLOGY

## 2024-11-13 PROCEDURE — 77063 BREAST TOMOSYNTHESIS BI: CPT

## 2024-11-13 RX ORDER — ESTRADIOL 2 MG/1
2 TABLET ORAL DAILY
Qty: 30 TABLET | Refills: 3 | Status: SHIPPED | OUTPATIENT
Start: 2024-11-13

## 2024-11-13 NOTE — PROGRESS NOTES
Echo Smith is a 49 y.o. female who presents today for the following:  Chief Complaint   Patient presents with    Results     Pt presents to discuss lab results //Woody         No Known Allergies    Current Outpatient Medications   Medication Sig Dispense Refill    estradiol (ESTRACE) 2 MG tablet Take 1 tablet by mouth daily 30 tablet 3    ciprofloxacin (CIPRO) 500 MG tablet Take 1 tablet by mouth 2 times daily for 7 days 14 tablet 0    ibuprofen (ADVIL;MOTRIN) 800 MG tablet Take 1 tablet by mouth every 6 hours as needed (Patient not taking: Reported on 10/28/2024)      pantoprazole (PROTONIX) 40 MG tablet Take 1 tablet every day by oral route before meals.      acetaminophen-codeine (TYLENOL #3) 300-30 MG per tablet TAKE 1 TABLET BY MOUTH 4 TIMES DAILY AS NEEDED      gabapentin (NEURONTIN) 300 MG capsule TAKE 1 CAPSULE BY MOUTH TWICE DAILY AND 2 AT BEDTIME      levothyroxine (SYNTHROID) 112 MCG tablet Take 1 tablet by mouth every morning Pt states she is now taking 115 mcg      topiramate (TOPAMAX) 50 MG tablet Take 1 tablet by mouth nightly      venlafaxine (EFFEXOR XR) 75 MG extended release capsule Take 1 capsule by mouth daily      AJOVY 225 MG/1.5ML SOSY INJECT 1 & 1/2 (ONE & ONE-HALF) ML  ONCE EVERY MONTH 1.5 mL 5    EQ ALLERGY RELIEF, CETIRIZINE, 10 MG tablet Take 1 tablet by mouth once daily 90 tablet 0    clopidogrel (PLAVIX) 75 MG tablet Take 1 tablet by mouth daily 30 tablet 11    SUMAtriptan Succinate Refill 6 MG/0.5ML SOCT Inject 1 Syringe into the skin as needed (migraine) (Patient not taking: Reported on 10/28/2024) 3 mL 3    atorvastatin (LIPITOR) 40 MG tablet Take 1 tablet by mouth daily      aspirin 81 MG chewable tablet Take 1 tablet by mouth daily       No current facility-administered medications for this visit.       Past Medical History:   Diagnosis Date    Abnormal Pap smear of cervix     Not sure of all the dates    Adrenal insufficiency (HCC) 1/18/2023    Atherosclerotic heart

## 2024-11-18 RX ORDER — TOPIRAMATE 50 MG/1
50 TABLET, FILM COATED ORAL NIGHTLY
Qty: 90 TABLET | Refills: 0 | Status: SHIPPED | OUTPATIENT
Start: 2024-11-18

## 2024-11-24 PROBLEM — I65.23 BILATERAL CAROTID ARTERY STENOSIS: Status: ACTIVE | Noted: 2024-11-24

## 2024-12-09 ENCOUNTER — PREP FOR PROCEDURE (OUTPATIENT)
Age: 49
End: 2024-12-09

## 2024-12-09 RX ORDER — SODIUM CHLORIDE 0.9 % (FLUSH) 0.9 %
5-40 SYRINGE (ML) INJECTION EVERY 12 HOURS SCHEDULED
Status: CANCELLED | OUTPATIENT
Start: 2024-12-09

## 2024-12-09 RX ORDER — SODIUM CHLORIDE 0.9 % (FLUSH) 0.9 %
5-40 SYRINGE (ML) INJECTION PRN
Status: CANCELLED | OUTPATIENT
Start: 2024-12-09

## 2024-12-09 RX ORDER — SODIUM CHLORIDE 9 MG/ML
INJECTION, SOLUTION INTRAVENOUS PRN
Status: CANCELLED | OUTPATIENT
Start: 2024-12-09

## 2024-12-10 ENCOUNTER — TELEPHONE (OUTPATIENT)
Age: 49
End: 2024-12-10

## 2024-12-10 NOTE — TELEPHONE ENCOUNTER
Left vm to let patient know her surgery for 12-18-24 is being cancelled. She told P.A.T. she had something else scheduled that day & the clearance from Dr. Bridges stated she needed to be seen first. I asked her to call to reschedule.    Per clearance note from Dr. Bridges, patient needs to be seen before being cleared for surgery. She has not been seen since 2022. Note is in chart.

## 2025-01-11 PROBLEM — Z11.4 SCREENING FOR HIV (HUMAN IMMUNODEFICIENCY VIRUS): Status: ACTIVE | Noted: 2025-01-11

## 2025-01-11 PROBLEM — Z12.11 SCREEN FOR COLON CANCER: Status: ACTIVE | Noted: 2025-01-11

## 2025-01-11 PROBLEM — F32.A DEPRESSION: Status: RESOLVED | Noted: 2022-07-05 | Resolved: 2025-01-11

## 2025-01-11 PROBLEM — Z11.59 NEED FOR HEPATITIS C SCREENING TEST: Status: ACTIVE | Noted: 2025-01-11

## 2025-01-20 ENCOUNTER — OFFICE VISIT (OUTPATIENT)
Age: 50
End: 2025-01-20
Payer: MEDICAID

## 2025-01-20 VITALS
HEIGHT: 59 IN | BODY MASS INDEX: 21.86 KG/M2 | DIASTOLIC BLOOD PRESSURE: 66 MMHG | SYSTOLIC BLOOD PRESSURE: 105 MMHG | WEIGHT: 108.44 LBS

## 2025-01-20 DIAGNOSIS — E03.9 HYPOTHYROIDISM, UNSPECIFIED TYPE: ICD-10-CM

## 2025-01-20 DIAGNOSIS — N95.1 SYMPTOMATIC MENOPAUSAL OR FEMALE CLIMACTERIC STATES: ICD-10-CM

## 2025-01-20 DIAGNOSIS — E07.9 THYROID DISEASE: Primary | ICD-10-CM

## 2025-01-20 PROCEDURE — 99213 OFFICE O/P EST LOW 20 MIN: CPT | Performed by: OBSTETRICS & GYNECOLOGY

## 2025-01-20 RX ORDER — ESTRADIOL 2 MG/1
2 TABLET ORAL DAILY
Qty: 30 TABLET | Refills: 3 | Status: SHIPPED | OUTPATIENT
Start: 2025-01-20

## 2025-01-20 NOTE — PROGRESS NOTES
Echo Smith is a 49 y.o. female who presents today for the following:  Chief Complaint   Patient presents with    Medication Check     Pt presents for medication follow up from starting estradiol with complaints of not feeling a difference//MKeeley         No Known Allergies    Current Outpatient Medications   Medication Sig Dispense Refill    topiramate (TOPAMAX) 50 MG tablet Take 1 tablet by mouth nightly 90 tablet 0    estradiol (ESTRACE) 2 MG tablet Take 1 tablet by mouth daily 30 tablet 3    pantoprazole (PROTONIX) 40 MG tablet Take 1 tablet every day by oral route before meals.      acetaminophen-codeine (TYLENOL #3) 300-30 MG per tablet TAKE 1 TABLET BY MOUTH 4 TIMES DAILY AS NEEDED      gabapentin (NEURONTIN) 300 MG capsule TAKE 1 CAPSULE BY MOUTH TWICE DAILY AND 2 AT BEDTIME      levothyroxine (SYNTHROID) 112 MCG tablet Take 1 tablet by mouth every morning Pt states she is now taking 115 mcg      venlafaxine (EFFEXOR XR) 75 MG extended release capsule Take 1 capsule by mouth daily      AJOVY 225 MG/1.5ML SOSY INJECT 1 & 1/2 (ONE & ONE-HALF) ML  ONCE EVERY MONTH 1.5 mL 5    EQ ALLERGY RELIEF, CETIRIZINE, 10 MG tablet Take 1 tablet by mouth once daily 90 tablet 0    clopidogrel (PLAVIX) 75 MG tablet Take 1 tablet by mouth daily 30 tablet 11    atorvastatin (LIPITOR) 40 MG tablet Take 1 tablet by mouth daily      aspirin 81 MG chewable tablet Take 1 tablet by mouth daily       No current facility-administered medications for this visit.       Past Medical History:   Diagnosis Date    Abnormal Pap smear of cervix     Not sure of all the dates    Adrenal insufficiency (HCC) 1/18/2023    Atherosclerotic heart disease 2023    nonobstructive CAD    Chest pain     Depression     Generalized anxiety disorder     GERD (gastroesophageal reflux disease)     Hypercholesterolemia     Hypertension     Hypothyroidism     IBS (irritable bowel syndrome)     Migraine     Moderate recurrent major depression (HCC)

## 2025-02-10 PROBLEM — Z11.59 NEED FOR HEPATITIS C SCREENING TEST: Status: RESOLVED | Noted: 2025-01-11 | Resolved: 2025-02-10

## 2025-02-10 PROBLEM — Z11.4 SCREENING FOR HIV (HUMAN IMMUNODEFICIENCY VIRUS): Status: RESOLVED | Noted: 2025-01-11 | Resolved: 2025-02-10

## 2025-02-10 PROBLEM — Z12.11 SCREEN FOR COLON CANCER: Status: RESOLVED | Noted: 2025-01-11 | Resolved: 2025-02-10

## 2025-03-09 RX ORDER — TOPIRAMATE 50 MG/1
50 TABLET, FILM COATED ORAL NIGHTLY
Qty: 90 TABLET | Refills: 0 | OUTPATIENT
Start: 2025-03-09

## 2025-03-17 ENCOUNTER — OFFICE VISIT (OUTPATIENT)
Age: 50
End: 2025-03-17
Payer: MEDICAID

## 2025-03-17 VITALS
RESPIRATION RATE: 16 BRPM | DIASTOLIC BLOOD PRESSURE: 76 MMHG | WEIGHT: 108 LBS | BODY MASS INDEX: 21.77 KG/M2 | HEART RATE: 88 BPM | HEIGHT: 59 IN | SYSTOLIC BLOOD PRESSURE: 108 MMHG | OXYGEN SATURATION: 100 %

## 2025-03-17 DIAGNOSIS — R13.14 PHARYNGOESOPHAGEAL DYSPHAGIA: ICD-10-CM

## 2025-03-17 DIAGNOSIS — E04.1 THYROID NODULE: Primary | ICD-10-CM

## 2025-03-17 PROCEDURE — 99204 OFFICE O/P NEW MOD 45 MIN: CPT | Performed by: OTOLARYNGOLOGY

## 2025-03-17 RX ORDER — RIZATRIPTAN BENZOATE 10 MG/1
TABLET, ORALLY DISINTEGRATING ORAL
COMMUNITY
Start: 2025-02-04

## 2025-03-17 RX ORDER — CEFUROXIME AXETIL 250 MG/1
TABLET ORAL
COMMUNITY
Start: 2025-02-06

## 2025-03-17 RX ORDER — MIRABEGRON 50 MG/1
50 TABLET, FILM COATED, EXTENDED RELEASE ORAL DAILY
COMMUNITY
Start: 2025-03-04

## 2025-03-17 NOTE — PROGRESS NOTES
this is likely to not be of benefit.  Particularly if she has hypothyroidism, we discussed that thyroidectomy will only worsen this and she will indeed continue to need to take her Synthroid  -I will request her records from her PCP office in case they have any additional information  -Otherwise lets check a thyroid ultrasound  -Discussed I do not suspect the dysphagia is related to the thyroid but we can again review an ultrasound  -Otherwise as far as dysphagia goes, consider barium swallow, consider flex laryngoscopy        The patient was instructed to return to clinic if no improvement or progression of symptoms. Signs to watch out for reviewed.      Monserrat Tian MD   [facility]  [address]  [place] [address]  Office Phone: [phone]

## 2025-03-18 RX ORDER — TOPIRAMATE 50 MG/1
50 TABLET, FILM COATED ORAL NIGHTLY
Qty: 90 TABLET | Refills: 0 | OUTPATIENT
Start: 2025-03-18

## 2025-04-16 ENCOUNTER — HOSPITAL ENCOUNTER (OUTPATIENT)
Facility: HOSPITAL | Age: 50
Discharge: HOME | End: 2025-04-19
Attending: OTOLARYNGOLOGY
Payer: MEDICAID

## 2025-04-16 DIAGNOSIS — E04.1 THYROID NODULE: ICD-10-CM

## 2025-04-16 PROCEDURE — 76536 US EXAM OF HEAD AND NECK: CPT

## 2025-04-25 ENCOUNTER — RESULTS FOLLOW-UP (OUTPATIENT)
Age: 50
End: 2025-04-25

## 2025-04-25 DIAGNOSIS — E04.1 THYROID NODULE: Primary | ICD-10-CM

## 2025-04-25 DIAGNOSIS — E03.9 HYPOTHYROIDISM, UNSPECIFIED TYPE: ICD-10-CM

## 2025-06-09 ENCOUNTER — TELEPHONE (OUTPATIENT)
Age: 50
End: 2025-06-09

## 2025-06-09 NOTE — TELEPHONE ENCOUNTER
Placed outbound call to the patient regarding the mychart request for an appointment with Dr. Churchill. Advised the patient that Dr. Churchill is out on Maternity leave and will be back on 7/30. That, Dr. Ahumada could see her in the same office for her hip instead. LVM for the patient to CB to confirm the R or L hip.

## 2025-06-23 ENCOUNTER — TELEPHONE (OUTPATIENT)
Age: 50
End: 2025-06-23

## 2025-07-01 ENCOUNTER — OFFICE VISIT (OUTPATIENT)
Age: 50
End: 2025-07-01
Payer: MEDICAID

## 2025-07-01 VITALS
DIASTOLIC BLOOD PRESSURE: 80 MMHG | OXYGEN SATURATION: 98 % | SYSTOLIC BLOOD PRESSURE: 110 MMHG | HEART RATE: 79 BPM | HEIGHT: 59 IN | BODY MASS INDEX: 22.18 KG/M2 | WEIGHT: 110 LBS

## 2025-07-01 DIAGNOSIS — M48.061 NEUROFORAMINAL STENOSIS OF LUMBAR SPINE: ICD-10-CM

## 2025-07-01 DIAGNOSIS — M25.551 RIGHT HIP PAIN: ICD-10-CM

## 2025-07-01 DIAGNOSIS — M48.07 NEUROFORAMINAL STENOSIS OF LUMBOSACRAL SPINE: ICD-10-CM

## 2025-07-01 DIAGNOSIS — M54.31 CHRONIC SCIATICA, RIGHT: ICD-10-CM

## 2025-07-01 DIAGNOSIS — G89.29 CHRONIC RIGHT-SIDED LOW BACK PAIN WITH RIGHT-SIDED SCIATICA: Primary | ICD-10-CM

## 2025-07-01 DIAGNOSIS — M47.816 FACET ARTHRITIS, DEGENERATIVE, LUMBAR SPINE: ICD-10-CM

## 2025-07-01 DIAGNOSIS — M54.41 CHRONIC RIGHT-SIDED LOW BACK PAIN WITH RIGHT-SIDED SCIATICA: Primary | ICD-10-CM

## 2025-07-01 PROCEDURE — 99204 OFFICE O/P NEW MOD 45 MIN: CPT | Performed by: ORTHOPAEDIC SURGERY

## 2025-07-01 RX ORDER — GALCANEZUMAB 120 MG/ML
INJECTION, SOLUTION SUBCUTANEOUS
COMMUNITY
Start: 2025-05-24

## 2025-07-01 RX ORDER — MECLIZINE HCL 12.5 MG 12.5 MG/1
TABLET ORAL
COMMUNITY

## 2025-07-01 RX ORDER — PREGABALIN 75 MG/1
CAPSULE ORAL
COMMUNITY
Start: 2025-06-10

## 2025-07-01 ASSESSMENT — PATIENT HEALTH QUESTIONNAIRE - PHQ9
SUM OF ALL RESPONSES TO PHQ QUESTIONS 1-9: 0
9. THOUGHTS THAT YOU WOULD BE BETTER OFF DEAD, OR OF HURTING YOURSELF: NOT AT ALL
SUM OF ALL RESPONSES TO PHQ QUESTIONS 1-9: 0
5. POOR APPETITE OR OVEREATING: NOT AT ALL
SUM OF ALL RESPONSES TO PHQ QUESTIONS 1-9: 0
3. TROUBLE FALLING OR STAYING ASLEEP: NOT AT ALL
4. FEELING TIRED OR HAVING LITTLE ENERGY: NOT AT ALL
8. MOVING OR SPEAKING SO SLOWLY THAT OTHER PEOPLE COULD HAVE NOTICED. OR THE OPPOSITE, BEING SO FIGETY OR RESTLESS THAT YOU HAVE BEEN MOVING AROUND A LOT MORE THAN USUAL: NOT AT ALL
6. FEELING BAD ABOUT YOURSELF - OR THAT YOU ARE A FAILURE OR HAVE LET YOURSELF OR YOUR FAMILY DOWN: NOT AT ALL
1. LITTLE INTEREST OR PLEASURE IN DOING THINGS: NOT AT ALL
2. FEELING DOWN, DEPRESSED OR HOPELESS: NOT AT ALL
7. TROUBLE CONCENTRATING ON THINGS, SUCH AS READING THE NEWSPAPER OR WATCHING TELEVISION: NOT AT ALL
10. IF YOU CHECKED OFF ANY PROBLEMS, HOW DIFFICULT HAVE THESE PROBLEMS MADE IT FOR YOU TO DO YOUR WORK, TAKE CARE OF THINGS AT HOME, OR GET ALONG WITH OTHER PEOPLE: NOT DIFFICULT AT ALL
SUM OF ALL RESPONSES TO PHQ QUESTIONS 1-9: 0

## 2025-07-01 NOTE — PROGRESS NOTES
Identified pt with two pt identifiers (name and ). Reviewed chart in preparation for visit and have obtained necessary documentation.     Echo Smith is a 50 y.o. female New Patient (NP right hip pain - she has some popping in the hip , having some swelling , has some issues with the pain shooting down her hip and will cause numbness in her toes)  .     Vitals:    25 1100   BP: 110/80   BP Site: Left Upper Arm   Patient Position: Sitting   BP Cuff Size: Medium Adult   Pulse: 79   SpO2: 98%   Weight: 49.9 kg (110 lb)   Height: 1.499 m (4' 11\")           1. Have you been to the ER, urgent care clinic since your last visit?  Hospitalized since your last visit?  no    2. Have you seen or consulted any other health care providers outside of the [facility] since your last visit?  Include any pap smears or colon screening.  no       
She states that she this was never explained to her by the pain management physician yesterday.    I recommended continuation of the ibuprofen, but limited to 4 tabs 3 times a day, alternating with 650 mg of Tylenol.    Continue heat to the lumbar spine.    Patient requested another opinion from a spine surgeon and she is referred to referral to Dr. Lopes for further discussion and treatment options.  All questions were answered.

## 2025-07-10 DIAGNOSIS — N95.1 SYMPTOMATIC MENOPAUSAL OR FEMALE CLIMACTERIC STATES: ICD-10-CM

## 2025-07-10 RX ORDER — ESTRADIOL 2 MG/1
2 TABLET ORAL DAILY
Qty: 30 TABLET | Refills: 3 | Status: SHIPPED | OUTPATIENT
Start: 2025-07-10

## 2025-07-14 ENCOUNTER — OFFICE VISIT (OUTPATIENT)
Age: 50
End: 2025-07-14
Payer: MEDICAID

## 2025-07-14 VITALS
HEIGHT: 59 IN | WEIGHT: 110 LBS | OXYGEN SATURATION: 98 % | SYSTOLIC BLOOD PRESSURE: 116 MMHG | BODY MASS INDEX: 22.18 KG/M2 | DIASTOLIC BLOOD PRESSURE: 78 MMHG | HEART RATE: 76 BPM

## 2025-07-14 DIAGNOSIS — E03.9 HYPOTHYROIDISM, UNSPECIFIED TYPE: ICD-10-CM

## 2025-07-14 DIAGNOSIS — R13.14 PHARYNGOESOPHAGEAL DYSPHAGIA: Primary | ICD-10-CM

## 2025-07-14 PROCEDURE — 99213 OFFICE O/P EST LOW 20 MIN: CPT | Performed by: OTOLARYNGOLOGY

## 2025-07-16 DIAGNOSIS — Z12.31 SCREENING MAMMOGRAM, ENCOUNTER FOR: Primary | ICD-10-CM

## 2025-07-17 ENCOUNTER — TELEPHONE (OUTPATIENT)
Age: 50
End: 2025-07-17

## 2025-07-17 NOTE — TELEPHONE ENCOUNTER
Called patient to reschedule appt on 7/25/25 due to Dr Lopes schedule change no longer having clinic on Fridays

## 2025-07-19 NOTE — PROGRESS NOTES
Otolaryngology-Head and Neck Surgery  Follow Up Patient Visit     Patient: Echo Smith  YOB: 1975  MRN: [id number]  Date of Service: 7/14/2025    Chief Complaint:   Chief Complaint   Patient presents with    Follow-up     Swelling on right side of neck         Interval hx   Continues to have dysphagia  More recently developed right neck swelling over the weekend which has since improved     History of Present Illness: Echo Smith is a 50 y.o. female who presents today for discussion of her thyroid    Notes chronic issues with her thyroid  Sounds like she has hormone dysregulation  She is not sure if it is hypo or hyperthyroid  Notes constant medication adjustments    In the last 6 months has developed some dysphagia symptoms  Unclear if this is related to her thyroid or not    She is not sure if she has thyroid nodules    No recent thyroid ultrasound    Review of her endocrinology notes from a few years ago suggest that she has hypothyroidism and she is currently on Synthroid    Past Medical History:  Past Medical History:   Diagnosis Date    Abnormal Pap smear of cervix     Not sure of all the dates    Adrenal insufficiency 1/18/2023    Atherosclerotic heart disease 2023    nonobstructive CAD    Chest pain     Depression     Generalized anxiety disorder     GERD (gastroesophageal reflux disease)     Hypercholesterolemia     Hypertension     Hypothyroidism     IBS (irritable bowel syndrome)     Migraine     Moderate recurrent major depression (HCC)     Osteoarthritis of right knee     Overactive bladder     PAD (peripheral artery disease)     Palpitations     SOB (shortness of breath) on exertion     Tendinitis of right rotator cuff        Past Surgical History:   Past Surgical History:   Procedure Laterality Date    APPENDECTOMY      CARDIAC PROCEDURE N/A 11/06/2023    Left heart cath / coronary angiography performed by Malik Reddy MD at [facility] CARDIAC CATH LAB    COLONOSCOPY      CYSTOSCOPY

## 2025-07-31 ENCOUNTER — OFFICE VISIT (OUTPATIENT)
Age: 50
End: 2025-07-31
Payer: MEDICAID

## 2025-07-31 VITALS
DIASTOLIC BLOOD PRESSURE: 78 MMHG | WEIGHT: 111.13 LBS | HEIGHT: 59 IN | BODY MASS INDEX: 22.4 KG/M2 | SYSTOLIC BLOOD PRESSURE: 118 MMHG

## 2025-07-31 DIAGNOSIS — N90.89 VULVAR LESION: Primary | ICD-10-CM

## 2025-07-31 PROCEDURE — 99213 OFFICE O/P EST LOW 20 MIN: CPT | Performed by: OBSTETRICS & GYNECOLOGY

## 2025-07-31 NOTE — PROGRESS NOTES
Echo Smith is a 50 y.o. female who presents today for the following:  Chief Complaint   Patient presents with    Other     Pt presents with complaints of bartholin cyst//MKeeley         No Known Allergies    Current Outpatient Medications   Medication Sig Dispense Refill    estradiol (ESTRACE) 2 MG tablet Take 1 tablet by mouth daily 30 tablet 3    EMGALITY 120 MG/ML SOAJ INJECT CONTENTS OF 1 ML SUBCUTANEOUSLY ONCE EVERY MONTH      pregabalin (LYRICA) 75 MG capsule TAKE 1 CAPSULE BY MOUTH THREE TIMES DAILY AS NEEDED FOR PAIN      meclizine (ANTIVERT) 12.5 MG tablet Take 1 tablet 3 times a day by oral route as needed, for vertigo.      MYRBETRIQ 50 MG TB24 Take 50 mg by mouth daily      SUMAtriptan (IMITREX) 6 MG/0.5ML SOAJ injection syringe INJECT 1 PEN SUBCUTANEOUSLY IF YOU WAKE UP WITH A BAD HEADACHE, REPEAT IN 2 HOURS IF NEEDED, MAXIMUM 2 INJECTIONS IN 24 HOURS      rizatriptan (MAXALT-MLT) 10 MG disintegrating tablet TAKE 1 TABLET BY MOUTH(OVER THE TONGUE) AT HEADACHE ONSET, REPEAT IN 2 HOURS IF NEEDED, MAXIMUM 2 TABLETS IN 24 HOURS      topiramate (TOPAMAX) 50 MG tablet Take 1 tablet by mouth nightly (Patient not taking: Reported on 7/1/2025) 90 tablet 0    pantoprazole (PROTONIX) 40 MG tablet Take 1 tablet every day by oral route before meals.      acetaminophen-codeine (TYLENOL #3) 300-30 MG per tablet TAKE 1 TABLET BY MOUTH 4 TIMES DAILY AS NEEDED (Patient not taking: Reported on 7/1/2025)      gabapentin (NEURONTIN) 300 MG capsule TAKE 1 CAPSULE BY MOUTH TWICE DAILY AND 2 AT BEDTIME (Patient not taking: Reported on 7/1/2025)      levothyroxine (SYNTHROID) 112 MCG tablet Take 1 tablet by mouth every morning Pt states she is now taking 115 mcg      venlafaxine (EFFEXOR XR) 75 MG extended release capsule Take 1 capsule by mouth daily      AJOVY 225 MG/1.5ML SOSY INJECT 1 & 1/2 (ONE & ONE-HALF) ML  ONCE EVERY MONTH (Patient not taking: Reported on 7/1/2025) 1.5 mL 5    EQ ALLERGY RELIEF, CETIRIZINE, 10 MG

## 2025-08-05 ENCOUNTER — OFFICE VISIT (OUTPATIENT)
Age: 50
End: 2025-08-05
Payer: MEDICAID

## 2025-08-05 VITALS — BODY MASS INDEX: 23.75 KG/M2 | WEIGHT: 117.8 LBS | HEIGHT: 59 IN

## 2025-08-05 DIAGNOSIS — G95.9 LUMBAR MYELOPATHY (HCC): ICD-10-CM

## 2025-08-05 DIAGNOSIS — M47.14 THORACIC MYELOPATHY: ICD-10-CM

## 2025-08-05 DIAGNOSIS — G95.9 CERVICAL MYELOPATHY (HCC): ICD-10-CM

## 2025-08-05 DIAGNOSIS — M54.12 CERVICAL RADICULOPATHY: ICD-10-CM

## 2025-08-05 DIAGNOSIS — M54.14 THORACIC RADICULOPATHY: ICD-10-CM

## 2025-08-05 DIAGNOSIS — M54.16 LUMBAR RADICULOPATHY: Primary | ICD-10-CM

## 2025-08-05 PROCEDURE — 99204 OFFICE O/P NEW MOD 45 MIN: CPT | Performed by: STUDENT IN AN ORGANIZED HEALTH CARE EDUCATION/TRAINING PROGRAM

## 2025-08-05 ASSESSMENT — PATIENT HEALTH QUESTIONNAIRE - PHQ9
SUM OF ALL RESPONSES TO PHQ QUESTIONS 1-9: 0
2. FEELING DOWN, DEPRESSED OR HOPELESS: NOT AT ALL
SUM OF ALL RESPONSES TO PHQ QUESTIONS 1-9: 0
1. LITTLE INTEREST OR PLEASURE IN DOING THINGS: NOT AT ALL
SUM OF ALL RESPONSES TO PHQ QUESTIONS 1-9: 0
SUM OF ALL RESPONSES TO PHQ QUESTIONS 1-9: 0

## 2025-08-14 ENCOUNTER — OFFICE VISIT (OUTPATIENT)
Age: 50
End: 2025-08-14
Payer: MEDICAID

## 2025-08-14 VITALS
SYSTOLIC BLOOD PRESSURE: 100 MMHG | TEMPERATURE: 98.4 F | DIASTOLIC BLOOD PRESSURE: 69 MMHG | RESPIRATION RATE: 18 BRPM | BODY MASS INDEX: 23.48 KG/M2 | WEIGHT: 116.5 LBS | HEART RATE: 81 BPM | HEIGHT: 59 IN | OXYGEN SATURATION: 99 %

## 2025-08-14 DIAGNOSIS — E03.9 HYPOTHYROIDISM (ACQUIRED): ICD-10-CM

## 2025-08-14 DIAGNOSIS — K90.9 INTESTINAL MALABSORPTION, UNSPECIFIED TYPE: Primary | ICD-10-CM

## 2025-08-14 PROCEDURE — 99203 OFFICE O/P NEW LOW 30 MIN: CPT | Performed by: STUDENT IN AN ORGANIZED HEALTH CARE EDUCATION/TRAINING PROGRAM

## 2025-08-14 RX ORDER — IBUPROFEN 800 MG/1
1 TABLET, FILM COATED ORAL EVERY 6 HOURS PRN
COMMUNITY

## 2025-08-14 RX ORDER — METHYLPREDNISOLONE 4 MG/1
4 TABLET ORAL SEE ADMIN INSTRUCTIONS
COMMUNITY
Start: 2025-08-11

## 2025-08-14 ASSESSMENT — ENCOUNTER SYMPTOMS
ABDOMINAL DISTENTION: 0
CONSTIPATION: 0
GASTROINTESTINAL NEGATIVE: 1
EYE PAIN: 0
EYE REDNESS: 0
DIARRHEA: 0
EYES NEGATIVE: 1
NAUSEA: 0
TROUBLE SWALLOWING: 0
RESPIRATORY NEGATIVE: 1
VOMITING: 0
EYE DISCHARGE: 0
SORE THROAT: 0

## 2025-08-25 ENCOUNTER — OFFICE VISIT (OUTPATIENT)
Age: 50
End: 2025-08-25
Payer: MEDICAID

## 2025-08-25 ENCOUNTER — PATIENT MESSAGE (OUTPATIENT)
Age: 50
End: 2025-08-25

## 2025-08-25 VITALS
OXYGEN SATURATION: 100 % | BODY MASS INDEX: 23.79 KG/M2 | DIASTOLIC BLOOD PRESSURE: 68 MMHG | HEIGHT: 59 IN | HEART RATE: 88 BPM | RESPIRATION RATE: 17 BRPM | WEIGHT: 118 LBS | SYSTOLIC BLOOD PRESSURE: 110 MMHG

## 2025-08-25 DIAGNOSIS — G62.9 NEUROPATHY: Primary | ICD-10-CM

## 2025-08-25 DIAGNOSIS — R07.9 CHEST PAIN, UNSPECIFIED TYPE: ICD-10-CM

## 2025-08-25 DIAGNOSIS — M79.606 PAIN OF LOWER EXTREMITY, UNSPECIFIED LATERALITY: ICD-10-CM

## 2025-08-25 DIAGNOSIS — Q27.8 ABERRANT RIGHT SUBCLAVIAN ARTERY: ICD-10-CM

## 2025-08-25 DIAGNOSIS — R06.02 SHORTNESS OF BREATH: ICD-10-CM

## 2025-08-25 DIAGNOSIS — N95.1 SYMPTOMATIC MENOPAUSAL OR FEMALE CLIMACTERIC STATES: ICD-10-CM

## 2025-08-25 PROCEDURE — 99204 OFFICE O/P NEW MOD 45 MIN: CPT | Performed by: STUDENT IN AN ORGANIZED HEALTH CARE EDUCATION/TRAINING PROGRAM

## 2025-08-25 PROCEDURE — 93010 ELECTROCARDIOGRAM REPORT: CPT | Performed by: STUDENT IN AN ORGANIZED HEALTH CARE EDUCATION/TRAINING PROGRAM

## 2025-08-25 PROCEDURE — 93005 ELECTROCARDIOGRAM TRACING: CPT | Performed by: STUDENT IN AN ORGANIZED HEALTH CARE EDUCATION/TRAINING PROGRAM

## 2025-08-25 RX ORDER — ATENOLOL 25 MG/1
TABLET ORAL
Qty: 3 TABLET | Refills: 0 | Status: SHIPPED
Start: 2025-08-25 | End: 2025-08-27 | Stop reason: SDUPTHER

## 2025-08-26 ENCOUNTER — TELEPHONE (OUTPATIENT)
Age: 50
End: 2025-08-26

## 2025-08-26 RX ORDER — ESTRADIOL 2 MG/1
2 TABLET ORAL DAILY
Qty: 30 TABLET | Refills: 3 | Status: SHIPPED | OUTPATIENT
Start: 2025-08-26

## 2025-08-27 DIAGNOSIS — R07.9 CHEST PAIN, UNSPECIFIED TYPE: ICD-10-CM

## 2025-08-27 RX ORDER — ATENOLOL 25 MG/1
TABLET ORAL
Qty: 3 TABLET | Refills: 0 | Status: SHIPPED | OUTPATIENT
Start: 2025-08-27

## (undated) DEVICE — PINNACLE INTRODUCER SHEATH: Brand: PINNACLE

## (undated) DEVICE — KIT COR DIAG CATH ANGIO 5FR CURVES JL 4.0 100CM JR 4.0

## (undated) DEVICE — OR HYBRID-MRMC: Brand: MEDLINE INDUSTRIES, INC.

## (undated) DEVICE — BLADE CLIPPER GEN PURP NS

## (undated) DEVICE — SYRINGE ANGIO CNTRST DEL 20 CC POLYCARB LIGHT GRN MEDALLION

## (undated) DEVICE — RADIFOCUS GLIDEWIRE: Brand: GLIDEWIRE

## (undated) DEVICE — BLANKET WRM W25XL64IN NONWOVEN SFT LTWT PLIABLE HYPR

## (undated) DEVICE — SOLUTION IV 1000ML 0.9% SOD CHL PH 5 INJ USP VIAFLX PLAS

## (undated) DEVICE — APPLICATOR MEDICATED 26 CC SOLUTION HI LT ORNG CHLORAPREP

## (undated) DEVICE — 3M™ TEGADERM™ TRANSPARENT FILM DRESSING FRAME STYLE, 1626W, 4 IN X 4-3/4 IN (10 CM X 12 CM), 50/CT 4CT/CASE: Brand: 3M™ TEGADERM™

## (undated) DEVICE — GUIDEWIRE VASC L150CM DIA0.035IN TIP L3MM PTFE J CRV FIX

## (undated) DEVICE — DRESSING HEMOSTATIC INTVENT W/O SLT QUIKCLOT

## (undated) DEVICE — PROVE COVER: Brand: UNBRANDED

## (undated) DEVICE — SHEATH 6FR 11CM BRITETIP

## (undated) DEVICE — SHEATH ENDOSCP CATH 7FR L90CM DEFLECTION L9MM 2.3MM 180DEG

## (undated) DEVICE — WASTEBAG DRIP/ADAPTER: Brand: MEDLINE INDUSTRIES, INC.

## (undated) DEVICE — SOLUTION IV 500ML 0.9% SOD CHL PH 5 INJ USP VIAFLX PLAS

## (undated) DEVICE — Device

## (undated) DEVICE — CATHETER ANGIO BER 038 3 CM 5 FRX65 CM SFT N BRAIDED SOFT-VU

## (undated) DEVICE — SURGICAL PROCEDURE TRAY CRD CATH 3 PRT

## (undated) DEVICE — CATHETER ANGIO 5FR L100CM GWIRE 0.038IN BERN NONBRAIDED HI

## (undated) DEVICE — GLOVE SURG SZ 8 L12IN FNGR THK94MIL STD WHT LTX FREE

## (undated) DEVICE — LIQUIBAND RAPID ADHESIVE 36/CS 0.8ML: Brand: MEDLINE

## (undated) DEVICE — KIT NDL 5FR L10CM GWIRE L40CM DIA0.018IN NDL 21GA L7CM NIT

## (undated) DEVICE — 48" PROBE COVER W/GEL, ULTRASOUND, STERILE: Brand: SITE-RITE

## (undated) DEVICE — PERCLOSE™ PROSTYLE™ SUTURE-MEDIATED CLOSURE AND REPAIR SYSTEM: Brand: PERCLOSE™ PROSTYLE™

## (undated) DEVICE — MICROPUNCTURE INTRODUCER SET SILHOUETTE TRANSITIONLESS WITH STAINLESS STEEL WIRE GUIDE: Brand: MICROPUNCTURE

## (undated) DEVICE — GOWN,SIRUS,NONRNF,SETINSLV,2XL,18/CS: Brand: MEDLINE